# Patient Record
Sex: MALE | Race: WHITE | NOT HISPANIC OR LATINO | Employment: OTHER | URBAN - METROPOLITAN AREA
[De-identification: names, ages, dates, MRNs, and addresses within clinical notes are randomized per-mention and may not be internally consistent; named-entity substitution may affect disease eponyms.]

---

## 2017-08-03 ENCOUNTER — HOSPITAL ENCOUNTER (INPATIENT)
Facility: HOSPITAL | Age: 67
LOS: 1 days | Discharge: HOME | End: 2017-08-05
Attending: FAMILY MEDICINE | Admitting: FAMILY MEDICINE

## 2017-08-03 DIAGNOSIS — Z78.9 IMPAIRED MOBILITY AND ADLS: ICD-10-CM

## 2017-08-03 DIAGNOSIS — Z74.09 IMPAIRED MOBILITY AND ADLS: ICD-10-CM

## 2017-08-03 DIAGNOSIS — Z74.09 IMPAIRED FUNCTIONAL MOBILITY, BALANCE, GAIT, AND ENDURANCE: Primary | ICD-10-CM

## 2017-08-03 LAB — GLUCOSE BLDC GLUCOMTR-MCNC: 295 MG/DL (ref 70–130)

## 2017-08-03 PROCEDURE — 82962 GLUCOSE BLOOD TEST: CPT

## 2017-08-03 RX ORDER — RAMIPRIL 10 MG/1
10 CAPSULE ORAL 2 TIMES DAILY
COMMUNITY

## 2017-08-03 RX ORDER — TAMSULOSIN HYDROCHLORIDE 0.4 MG/1
1 CAPSULE ORAL EVERY MORNING
COMMUNITY

## 2017-08-03 RX ORDER — MECLIZINE HYDROCHLORIDE 25 MG/1
25 TABLET ORAL 3 TIMES DAILY PRN
COMMUNITY
End: 2017-08-05 | Stop reason: HOSPADM

## 2017-08-03 RX ORDER — GLIPIZIDE 10 MG/1
10 TABLET ORAL
COMMUNITY
End: 2017-08-05 | Stop reason: HOSPADM

## 2017-08-03 RX ORDER — TRIAMTERENE AND HYDROCHLOROTHIAZIDE 37.5; 25 MG/1; MG/1
1 CAPSULE ORAL DAILY
COMMUNITY

## 2017-08-03 RX ORDER — HYDROCODONE BITARTRATE AND ACETAMINOPHEN 7.5; 325 MG/1; MG/1
1 TABLET ORAL 3 TIMES DAILY
COMMUNITY

## 2017-08-03 RX ORDER — AMLODIPINE BESYLATE 10 MG/1
10 TABLET ORAL DAILY
COMMUNITY

## 2017-08-03 RX ORDER — ASPIRIN 81 MG/1
81 TABLET, CHEWABLE ORAL DAILY
COMMUNITY

## 2017-08-04 ENCOUNTER — APPOINTMENT (OUTPATIENT)
Dept: CARDIOLOGY | Facility: HOSPITAL | Age: 67
End: 2017-08-04
Attending: FAMILY MEDICINE

## 2017-08-04 ENCOUNTER — APPOINTMENT (OUTPATIENT)
Dept: CT IMAGING | Facility: HOSPITAL | Age: 67
End: 2017-08-04

## 2017-08-04 ENCOUNTER — APPOINTMENT (OUTPATIENT)
Dept: MRI IMAGING | Facility: HOSPITAL | Age: 67
End: 2017-08-04

## 2017-08-04 ENCOUNTER — APPOINTMENT (OUTPATIENT)
Dept: MRI IMAGING | Facility: HOSPITAL | Age: 67
End: 2017-08-04
Attending: PSYCHIATRY & NEUROLOGY

## 2017-08-04 PROBLEM — N40.0 BPH (BENIGN PROSTATIC HYPERPLASIA): Chronic | Status: ACTIVE | Noted: 2017-08-04

## 2017-08-04 PROBLEM — I10 HYPERTENSION: Chronic | Status: ACTIVE | Noted: 2017-08-04

## 2017-08-04 PROBLEM — I63.9 CEREBELLAR STROKE (HCC): Status: ACTIVE | Noted: 2017-08-04

## 2017-08-04 PROBLEM — E11.8 TYPE 2 DIABETES MELLITUS WITH COMPLICATION, WITHOUT LONG-TERM CURRENT USE OF INSULIN (HCC): Chronic | Status: ACTIVE | Noted: 2017-08-04

## 2017-08-04 LAB
ALBUMIN SERPL-MCNC: 4.2 G/DL (ref 3.2–4.8)
ALBUMIN/GLOB SERPL: 1.2 G/DL (ref 1.5–2.5)
ALP SERPL-CCNC: 54 U/L (ref 25–100)
ALT SERPL W P-5'-P-CCNC: 18 U/L (ref 7–40)
ANION GAP SERPL CALCULATED.3IONS-SCNC: 13 MMOL/L (ref 3–11)
ARTICHOKE IGE QN: 108 MG/DL (ref 0–130)
AST SERPL-CCNC: 15 U/L (ref 0–33)
BH CV ECHO MEAS - AO ROOT AREA (BSA CORRECTED): 1.4
BH CV ECHO MEAS - AO ROOT AREA: 9.6 CM^2
BH CV ECHO MEAS - AO ROOT DIAM: 3.5 CM
BH CV ECHO MEAS - BSA(HAYCOCK): 2.6 M^2
BH CV ECHO MEAS - BSA: 2.4 M^2
BH CV ECHO MEAS - BZI_BMI: 42.5 KILOGRAMS/M^2
BH CV ECHO MEAS - BZI_METRIC_HEIGHT: 175.3 CM
BH CV ECHO MEAS - BZI_METRIC_WEIGHT: 130.6 KG
BH CV ECHO MEAS - CONTRAST EF (2CH): 63.5 ML/M^2
BH CV ECHO MEAS - CONTRAST EF 4CH: 67.2 ML/M^2
BH CV ECHO MEAS - EDV(CUBED): 107 ML
BH CV ECHO MEAS - EDV(MOD-SP2): 126 ML
BH CV ECHO MEAS - EDV(MOD-SP4): 137 ML
BH CV ECHO MEAS - EDV(TEICH): 104.8 ML
BH CV ECHO MEAS - EF(CUBED): 63.4 %
BH CV ECHO MEAS - EF(MOD-SP2): 63.5 %
BH CV ECHO MEAS - EF(MOD-SP4): 65 %
BH CV ECHO MEAS - EF(TEICH): 54.8 %
BH CV ECHO MEAS - ESV(CUBED): 39.2 ML
BH CV ECHO MEAS - ESV(MOD-SP2): 46 ML
BH CV ECHO MEAS - ESV(MOD-SP4): 45 ML
BH CV ECHO MEAS - ESV(TEICH): 47.3 ML
BH CV ECHO MEAS - FS: 28.5 %
BH CV ECHO MEAS - IVS/LVPW: 1.1
BH CV ECHO MEAS - IVSD: 1.5 CM
BH CV ECHO MEAS - LA DIMENSION: 3.8 CM
BH CV ECHO MEAS - LA/AO: 1.1
BH CV ECHO MEAS - LAT PEAK E' VEL: 10.9 CM/SEC
BH CV ECHO MEAS - LV DIASTOLIC VOL/BSA (35-75): 56.8 ML/M^2
BH CV ECHO MEAS - LV IVRT: 0.09 SEC
BH CV ECHO MEAS - LV MASS(C)D: 273.5 GRAMS
BH CV ECHO MEAS - LV MASS(C)DI: 113.4 GRAMS/M^2
BH CV ECHO MEAS - LV SYSTOLIC VOL/BSA (12-30): 18.7 ML/M^2
BH CV ECHO MEAS - LVIDD: 4.7 CM
BH CV ECHO MEAS - LVIDS: 3.4 CM
BH CV ECHO MEAS - LVLD AP2: 8.9 CM
BH CV ECHO MEAS - LVLD AP4: 9.1 CM
BH CV ECHO MEAS - LVLS AP2: 7.9 CM
BH CV ECHO MEAS - LVLS AP4: 8.4 CM
BH CV ECHO MEAS - LVOT AREA (M): 3.8 CM^2
BH CV ECHO MEAS - LVOT AREA: 3.9 CM^2
BH CV ECHO MEAS - LVOT DIAM: 2.2 CM
BH CV ECHO MEAS - LVPWD: 1.4 CM
BH CV ECHO MEAS - MED PEAK E' VEL: 6.69 CM/SEC
BH CV ECHO MEAS - MV A MAX VEL: 65.6 CM/SEC
BH CV ECHO MEAS - MV DEC TIME: 0.25 SEC
BH CV ECHO MEAS - MV E MAX VEL: 66.7 CM/SEC
BH CV ECHO MEAS - MV E/A: 1
BH CV ECHO MEAS - PA ACC SLOPE: 786.8 CM/SEC^2
BH CV ECHO MEAS - PA ACC TIME: 0.11 SEC
BH CV ECHO MEAS - PA PR(ACCEL): 29.9 MMHG
BH CV ECHO MEAS - RAP SYSTOLE: 8 MMHG
BH CV ECHO MEAS - RVSP: 25 MMHG
BH CV ECHO MEAS - SI(CUBED): 28.1 ML/M^2
BH CV ECHO MEAS - SI(MOD-SP2): 33.2 ML/M^2
BH CV ECHO MEAS - SI(MOD-SP4): 38.1 ML/M^2
BH CV ECHO MEAS - SI(TEICH): 23.8 ML/M^2
BH CV ECHO MEAS - SV(CUBED): 67.8 ML
BH CV ECHO MEAS - SV(MOD-SP2): 80 ML
BH CV ECHO MEAS - SV(MOD-SP4): 92 ML
BH CV ECHO MEAS - SV(TEICH): 57.5 ML
BH CV ECHO MEAS - TAPSE (>1.6): 2.6 CM2
BH CV ECHO MEAS - TR MAX VEL: 208.7 CM/SEC
BH CV VAS BP LEFT ARM: NORMAL MMHG
BH CV XLRA - RV BASE: 5.4 CM
BH CV XLRA - RV LENGTH: 8.7 CM
BH CV XLRA - RV MID: 4.1 CM
BH CV XLRA - TDI S': 16 CM/SEC
BH CV XLRA MEAS LEFT CCA RATIO VEL: 103 CM/SEC
BH CV XLRA MEAS LEFT DIST CCA EDV: 15.4 CM/SEC
BH CV XLRA MEAS LEFT DIST CCA PSV: 104.1 CM/SEC
BH CV XLRA MEAS LEFT DIST ICA EDV: 22.4 CM/SEC
BH CV XLRA MEAS LEFT DIST ICA PSV: 85.6 CM/SEC
BH CV XLRA MEAS LEFT ICA RATIO VEL: 67.2 CM/SEC
BH CV XLRA MEAS LEFT ICA/CCA RATIO: 0.65
BH CV XLRA MEAS LEFT MID ICA EDV: 19.3 CM/SEC
BH CV XLRA MEAS LEFT MID ICA PSV: 67.6 CM/SEC
BH CV XLRA MEAS LEFT PROX CCA EDV: 16.3 CM/SEC
BH CV XLRA MEAS LEFT PROX CCA PSV: 116.9 CM/SEC
BH CV XLRA MEAS LEFT PROX ECA PSV: 123.6 CM/SEC
BH CV XLRA MEAS LEFT PROX ICA EDV: 14.9 CM/SEC
BH CV XLRA MEAS LEFT PROX ICA PSV: 53.8 CM/SEC
BH CV XLRA MEAS LEFT PROX SCLA PSV: 110.9 CM/SEC
BH CV XLRA MEAS LEFT VERTEBRAL A EDV: 4.3 CM/SEC
BH CV XLRA MEAS LEFT VERTEBRAL A PSV: 28.7 CM/SEC
BH CV XLRA MEAS RIGHT CCA RATIO VEL: 88 CM/SEC
BH CV XLRA MEAS RIGHT DIST CCA EDV: 13.4 CM/SEC
BH CV XLRA MEAS RIGHT DIST CCA PSV: 88.8 CM/SEC
BH CV XLRA MEAS RIGHT DIST ICA EDV: 23.6 CM/SEC
BH CV XLRA MEAS RIGHT DIST ICA PSV: 114.7 CM/SEC
BH CV XLRA MEAS RIGHT ICA RATIO VEL: 126 CM/SEC
BH CV XLRA MEAS RIGHT ICA/CCA RATIO: 1.4
BH CV XLRA MEAS RIGHT MID ICA EDV: 18.9 CM/SEC
BH CV XLRA MEAS RIGHT MID ICA PSV: 126.5 CM/SEC
BH CV XLRA MEAS RIGHT PROX CCA EDV: 7.9 CM/SEC
BH CV XLRA MEAS RIGHT PROX CCA PSV: 116.3 CM/SEC
BH CV XLRA MEAS RIGHT PROX ECA PSV: 131.2 CM/SEC
BH CV XLRA MEAS RIGHT PROX ICA EDV: 22 CM/SEC
BH CV XLRA MEAS RIGHT PROX ICA PSV: 99 CM/SEC
BH CV XLRA MEAS RIGHT PROX SCLA PSV: 103.7 CM/SEC
BH CV XLRA MEAS RIGHT VERTEBRAL A EDV: 12.8 CM/SEC
BH CV XLRA MEAS RIGHT VERTEBRAL A PSV: 44.7 CM/SEC
BILIRUB SERPL-MCNC: 1 MG/DL (ref 0.3–1.2)
BILIRUB UR QL STRIP: NEGATIVE
BUN BLD-MCNC: 23 MG/DL (ref 9–23)
BUN/CREAT SERPL: 25.6 (ref 7–25)
CALCIUM SPEC-SCNC: 10 MG/DL (ref 8.7–10.4)
CHLORIDE SERPL-SCNC: 96 MMOL/L (ref 99–109)
CHOLEST SERPL-MCNC: 170 MG/DL (ref 0–200)
CLARITY UR: CLEAR
CO2 SERPL-SCNC: 21 MMOL/L (ref 20–31)
COLOR UR: YELLOW
CREAT BLD-MCNC: 0.9 MG/DL (ref 0.6–1.3)
DEPRECATED RDW RBC AUTO: 41.6 FL (ref 37–54)
E/E' RATIO: 7.5
ERYTHROCYTE [DISTWIDTH] IN BLOOD BY AUTOMATED COUNT: 12.7 % (ref 11.3–14.5)
GFR SERPL CREATININE-BSD FRML MDRD: 84 ML/MIN/1.73
GLOBULIN UR ELPH-MCNC: 3.4 GM/DL
GLUCOSE BLD-MCNC: 287 MG/DL (ref 70–100)
GLUCOSE BLDC GLUCOMTR-MCNC: 223 MG/DL (ref 70–130)
GLUCOSE BLDC GLUCOMTR-MCNC: 229 MG/DL (ref 70–130)
GLUCOSE BLDC GLUCOMTR-MCNC: 248 MG/DL (ref 70–130)
GLUCOSE BLDC GLUCOMTR-MCNC: 256 MG/DL (ref 70–130)
GLUCOSE BLDC GLUCOMTR-MCNC: 287 MG/DL (ref 70–130)
GLUCOSE UR STRIP-MCNC: ABNORMAL MG/DL
HBA1C MFR BLD: 12.5 % (ref 4.8–5.6)
HCT VFR BLD AUTO: 43.5 % (ref 38.9–50.9)
HDLC SERPL-MCNC: 33 MG/DL (ref 40–60)
HGB BLD-MCNC: 14.6 G/DL (ref 13.1–17.5)
HGB UR QL STRIP.AUTO: NEGATIVE
KETONES UR QL STRIP: NEGATIVE
LEFT ATRIUM VOLUME INDEX: 31.5 ML/M2
LEUKOCYTE ESTERASE UR QL STRIP.AUTO: NEGATIVE
LV EF 2D ECHO EST: 65 %
MCH RBC QN AUTO: 30.1 PG (ref 27–31)
MCHC RBC AUTO-ENTMCNC: 33.6 G/DL (ref 32–36)
MCV RBC AUTO: 89.7 FL (ref 80–99)
NITRITE UR QL STRIP: NEGATIVE
PH UR STRIP.AUTO: <=5 [PH] (ref 5–8)
PLATELET # BLD AUTO: 239 10*3/MM3 (ref 150–450)
PMV BLD AUTO: 9.1 FL (ref 6–12)
POTASSIUM BLD-SCNC: 3.9 MMOL/L (ref 3.5–5.5)
PROT SERPL-MCNC: 7.6 G/DL (ref 5.7–8.2)
PROT UR QL STRIP: NEGATIVE
RBC # BLD AUTO: 4.85 10*6/MM3 (ref 4.2–5.76)
SODIUM BLD-SCNC: 130 MMOL/L (ref 132–146)
SP GR UR STRIP: 1.03 (ref 1–1.03)
TRIGL SERPL-MCNC: 258 MG/DL (ref 0–150)
UROBILINOGEN UR QL STRIP: ABNORMAL
WBC NRBC COR # BLD: 7.23 10*3/MM3 (ref 3.5–10.8)

## 2017-08-04 PROCEDURE — 25010000002 SULFUR HEXAFLUORIDE MICROSPH 60.7-25 MG RECONSTITUTED SUSPENSION: Performed by: HOSPITALIST

## 2017-08-04 PROCEDURE — 82962 GLUCOSE BLOOD TEST: CPT

## 2017-08-04 PROCEDURE — 99223 1ST HOSP IP/OBS HIGH 75: CPT | Performed by: FAMILY MEDICINE

## 2017-08-04 PROCEDURE — 97165 OT EVAL LOW COMPLEX 30 MIN: CPT

## 2017-08-04 PROCEDURE — 93005 ELECTROCARDIOGRAM TRACING: CPT | Performed by: FAMILY MEDICINE

## 2017-08-04 PROCEDURE — C8929 TTE W OR WO FOL WCON,DOPPLER: HCPCS

## 2017-08-04 PROCEDURE — 70450 CT HEAD/BRAIN W/O DYE: CPT

## 2017-08-04 PROCEDURE — 85027 COMPLETE CBC AUTOMATED: CPT | Performed by: FAMILY MEDICINE

## 2017-08-04 PROCEDURE — 80053 COMPREHEN METABOLIC PANEL: CPT | Performed by: FAMILY MEDICINE

## 2017-08-04 PROCEDURE — 83036 HEMOGLOBIN GLYCOSYLATED A1C: CPT | Performed by: FAMILY MEDICINE

## 2017-08-04 PROCEDURE — 93010 ELECTROCARDIOGRAM REPORT: CPT | Performed by: INTERNAL MEDICINE

## 2017-08-04 PROCEDURE — 80061 LIPID PANEL: CPT | Performed by: FAMILY MEDICINE

## 2017-08-04 PROCEDURE — G0108 DIAB MANAGE TRN  PER INDIV: HCPCS | Performed by: REGISTERED NURSE

## 2017-08-04 PROCEDURE — 97162 PT EVAL MOD COMPLEX 30 MIN: CPT

## 2017-08-04 PROCEDURE — 93880 EXTRACRANIAL BILAT STUDY: CPT | Performed by: INTERNAL MEDICINE

## 2017-08-04 PROCEDURE — 81003 URINALYSIS AUTO W/O SCOPE: CPT | Performed by: FAMILY MEDICINE

## 2017-08-04 PROCEDURE — 99223 1ST HOSP IP/OBS HIGH 75: CPT | Performed by: PSYCHIATRY & NEUROLOGY

## 2017-08-04 PROCEDURE — 63710000001 INSULIN LISPRO (HUMAN) PER 5 UNITS: Performed by: FAMILY MEDICINE

## 2017-08-04 PROCEDURE — 93306 TTE W/DOPPLER COMPLETE: CPT | Performed by: INTERNAL MEDICINE

## 2017-08-04 PROCEDURE — 93880 EXTRACRANIAL BILAT STUDY: CPT

## 2017-08-04 PROCEDURE — 70544 MR ANGIOGRAPHY HEAD W/O DYE: CPT

## 2017-08-04 RX ORDER — ATORVASTATIN CALCIUM 40 MG/1
80 TABLET, FILM COATED ORAL NIGHTLY
Status: DISCONTINUED | OUTPATIENT
Start: 2017-08-04 | End: 2017-08-05 | Stop reason: HOSPADM

## 2017-08-04 RX ORDER — ASPIRIN 300 MG/1
300 SUPPOSITORY RECTAL DAILY
Status: DISCONTINUED | OUTPATIENT
Start: 2017-08-04 | End: 2017-08-05 | Stop reason: HOSPADM

## 2017-08-04 RX ORDER — BISACODYL 10 MG
10 SUPPOSITORY, RECTAL RECTAL DAILY PRN
Status: DISCONTINUED | OUTPATIENT
Start: 2017-08-03 | End: 2017-08-05 | Stop reason: HOSPADM

## 2017-08-04 RX ORDER — ACETAMINOPHEN 325 MG/1
650 TABLET ORAL EVERY 4 HOURS PRN
Status: DISCONTINUED | OUTPATIENT
Start: 2017-08-03 | End: 2017-08-05 | Stop reason: HOSPADM

## 2017-08-04 RX ORDER — ACETAMINOPHEN 650 MG/1
650 SUPPOSITORY RECTAL EVERY 4 HOURS PRN
Status: DISCONTINUED | OUTPATIENT
Start: 2017-08-03 | End: 2017-08-05 | Stop reason: HOSPADM

## 2017-08-04 RX ORDER — TRIAMTERENE AND HYDROCHLOROTHIAZIDE 37.5; 25 MG/1; MG/1
1 TABLET ORAL DAILY
Status: DISCONTINUED | OUTPATIENT
Start: 2017-08-04 | End: 2017-08-05 | Stop reason: HOSPADM

## 2017-08-04 RX ORDER — NICOTINE POLACRILEX 4 MG
15 LOZENGE BUCCAL
Status: DISCONTINUED | OUTPATIENT
Start: 2017-08-04 | End: 2017-08-05 | Stop reason: HOSPADM

## 2017-08-04 RX ORDER — RAMIPRIL 5 MG/1
10 CAPSULE ORAL EVERY 12 HOURS SCHEDULED
Status: DISCONTINUED | OUTPATIENT
Start: 2017-08-04 | End: 2017-08-05 | Stop reason: HOSPADM

## 2017-08-04 RX ORDER — HYDROCODONE BITARTRATE AND ACETAMINOPHEN 7.5; 325 MG/1; MG/1
1 TABLET ORAL 3 TIMES DAILY
Status: DISCONTINUED | OUTPATIENT
Start: 2017-08-04 | End: 2017-08-05 | Stop reason: HOSPADM

## 2017-08-04 RX ORDER — ASPIRIN 325 MG
325 TABLET ORAL DAILY
Status: DISCONTINUED | OUTPATIENT
Start: 2017-08-04 | End: 2017-08-05 | Stop reason: HOSPADM

## 2017-08-04 RX ORDER — AMLODIPINE BESYLATE 10 MG/1
10 TABLET ORAL DAILY
Status: DISCONTINUED | OUTPATIENT
Start: 2017-08-04 | End: 2017-08-05 | Stop reason: HOSPADM

## 2017-08-04 RX ORDER — ONDANSETRON 2 MG/ML
4 INJECTION INTRAMUSCULAR; INTRAVENOUS EVERY 6 HOURS PRN
Status: DISCONTINUED | OUTPATIENT
Start: 2017-08-03 | End: 2017-08-05 | Stop reason: HOSPADM

## 2017-08-04 RX ORDER — TAMSULOSIN HYDROCHLORIDE 0.4 MG/1
0.4 CAPSULE ORAL EVERY MORNING
Status: DISCONTINUED | OUTPATIENT
Start: 2017-08-04 | End: 2017-08-05 | Stop reason: HOSPADM

## 2017-08-04 RX ORDER — SODIUM CHLORIDE 0.9 % (FLUSH) 0.9 %
1-10 SYRINGE (ML) INJECTION AS NEEDED
Status: DISCONTINUED | OUTPATIENT
Start: 2017-08-03 | End: 2017-08-05 | Stop reason: HOSPADM

## 2017-08-04 RX ORDER — DEXTROSE MONOHYDRATE 25 G/50ML
25 INJECTION, SOLUTION INTRAVENOUS
Status: DISCONTINUED | OUTPATIENT
Start: 2017-08-04 | End: 2017-08-05 | Stop reason: HOSPADM

## 2017-08-04 RX ADMIN — TAMSULOSIN HYDROCHLORIDE 0.4 MG: 0.4 CAPSULE ORAL at 08:31

## 2017-08-04 RX ADMIN — INSULIN LISPRO 3 UNITS: 100 INJECTION, SOLUTION INTRAVENOUS; SUBCUTANEOUS at 16:54

## 2017-08-04 RX ADMIN — TRIAMTERENE AND HYDROCHLOROTHIAZIDE 1 TABLET: 37.5; 25 TABLET ORAL at 08:31

## 2017-08-04 RX ADMIN — SULFUR HEXAFLUORIDE 2 ML: KIT at 03:15

## 2017-08-04 RX ADMIN — INSULIN LISPRO 4 UNITS: 100 INJECTION, SOLUTION INTRAVENOUS; SUBCUTANEOUS at 02:33

## 2017-08-04 RX ADMIN — HYDROCODONE BITARTRATE AND ACETAMINOPHEN 1 TABLET: 7.5; 325 TABLET ORAL at 21:35

## 2017-08-04 RX ADMIN — AMLODIPINE BESYLATE 10 MG: 10 TABLET ORAL at 08:31

## 2017-08-04 RX ADMIN — HYDROCODONE BITARTRATE AND ACETAMINOPHEN 1 TABLET: 7.5; 325 TABLET ORAL at 15:31

## 2017-08-04 RX ADMIN — HYDROCODONE BITARTRATE AND ACETAMINOPHEN 1 TABLET: 7.5; 325 TABLET ORAL at 05:28

## 2017-08-04 RX ADMIN — ATORVASTATIN CALCIUM 80 MG: 40 TABLET, FILM COATED ORAL at 21:35

## 2017-08-04 RX ADMIN — ASPIRIN 325 MG ORAL TABLET 325 MG: 325 PILL ORAL at 08:31

## 2017-08-04 RX ADMIN — RAMIPRIL 10 MG: 5 CAPSULE ORAL at 08:31

## 2017-08-04 RX ADMIN — Medication 10 ML: at 21:37

## 2017-08-04 RX ADMIN — INSULIN LISPRO 3 UNITS: 100 INJECTION, SOLUTION INTRAVENOUS; SUBCUTANEOUS at 21:38

## 2017-08-04 RX ADMIN — INSULIN LISPRO 3 UNITS: 100 INJECTION, SOLUTION INTRAVENOUS; SUBCUTANEOUS at 12:33

## 2017-08-04 RX ADMIN — ATORVASTATIN CALCIUM 80 MG: 40 TABLET, FILM COATED ORAL at 02:33

## 2017-08-04 RX ADMIN — INSULIN LISPRO 4 UNITS: 100 INJECTION, SOLUTION INTRAVENOUS; SUBCUTANEOUS at 08:31

## 2017-08-04 RX ADMIN — RAMIPRIL 10 MG: 5 CAPSULE ORAL at 21:35

## 2017-08-04 NOTE — PROGRESS NOTES
Discharge Planning Assessment  [facility]     Patient Name: Rafy Pack  MRN: [id number]  Today's Date: 8/4/2017    Admit Date: 8/3/2017          Discharge Needs Assessment       08/04/17 1501    Living Environment    Lives With spouse    Living Arrangements house    Home Accessibility no concerns    Stair Railings at Home none    Type of Financial/Environmental Concern none    Transportation Available car    Living Environment    Provides Primary Care For no one    Quality Of Family Relationships helpful;involved;supportive    Able to Return to Prior Living Arrangements yes    Discharge Needs Assessment    Concerns To Be Addressed discharge planning concerns    Readmission Within The Last 30 Days no previous admission in last 30 days    Anticipated Changes Related to Illness none    Equipment Currently Used at Home walker, rolling    Equipment Needed After Discharge none    Discharge Contact Information if Applicable Risa Pack, [phone]            Discharge Plan       08/04/17 1502    Case Management/Social Work Plan    Plan Home    Patient/Family In Agreement With Plan yes    Additional Comments Pt lives in [facility] with his wife. His adult children and sister live near him. He has a walker but reports he does not use it often. He was independent with all ADLs prior to admit. He has medicare part D and reports he is followed by his PCP. His goal for discharge is to return home. CM will continue to follow.        Discharge Placement     No information found                Demographic Summary       08/04/17 1459    Referral Information    Admission Type inpatient    Referral Source physician    Reason For Consult discharge planning    Contact Information    Permission Granted to Share Information With ;family/designee;facility     Primary Care Physician Information    Name Wes Segura            Functional Status       08/04/17 1455    Functional Status Current    Current  Functional Level Comment See PT eval    Functional Status Prior    Ambulation 1-->assistive equipment    Transferring 0-->independent    Toileting 0-->independent    Bathing 0-->independent    Dressing 0-->independent    Eating 0-->independent    Communication 0-->understands/communicates without difficulty    Swallowing 0-->swallows foods/liquids without difficulty    IADL    Medications independent    Meal Preparation independent    Housekeeping independent    Laundry independent    Shopping independent    Oral Care independent            Psychosocial     None            Abuse/Neglect     None            Legal     None            Substance Abuse     None            Patient Forms     None          Alexus Jorge RN

## 2017-08-04 NOTE — THERAPY EVALUATION
Acute Care - Occupational Therapy Initial Evaluation  [facility]     Patient Name: Rafy Pack  : 1950  MRN: [id number]  Today's Date: 2017  Onset of Illness/Injury or Date of Surgery Date: 17  Date of Referral to OT: 17  Referring Physician: MD ANNE    Admit Date: 8/3/2017       ICD-10-CM ICD-9-CM   1. Impaired functional mobility, balance, gait, and endurance Z74.09 V49.89   2. Impaired mobility and ADLs Z74.09 799.89     Patient Active Problem List   Diagnosis   • Cerebellar stroke   • BPH (benign prostatic hyperplasia)   • Type 2 diabetes mellitus with complication, without long-term current use of insulin   • Hypertension     Past Medical History:   Diagnosis Date   • BPH (benign prostatic hyperplasia) 6 years   • Chronic back pain    • Diabetes mellitus    • Discitis    • Hypertension    • Stroke      History reviewed. No pertinent surgical history.       OT ASSESSMENT FLOWSHEET (last 72 hours)      OT Evaluation       17 0830 17 0825 17 2349 17 2343       Rehab Evaluation    Document Type evaluation   COMPLETED CHART REVIEW [id number]. CO-RX WITH O.T.   -CD evaluation  -AN       Subjective Information agree to therapy;complains of;dizziness;pain  -CD no complaints;agree to therapy  -AN       Patient Effort, Rehab Treatment  good  -AN       Symptoms Noted During/After Treatment  fatigue  -AN       General Information    Patient Profile Review yes  -CD yes  -AN       Onset of Illness/Injury or Date of Surgery Date 17  -CD 17  -AN       Referring Physician MD ANNE  -CD MD Anne  -AN       General Observations PT UP IN ROOM WITH NSG AND O.T. UPON ARRIVAL. PT ON RA   -CD Pt sitting EOB with nursing, wanting to go to bathroom,pt with facial droop; son present  -AN       Pertinent History Of Current Problem PT TO OSH  WITH 2 DAY H/O SEVERE VERTIGO AND VIOLENT N&V, WAS DISCHARGED HOME WITH DIAGNOSIS OF GASTROENTERITIS AND CONITINUED TO HAVE  SYMPTOMS. PT WENT TO PCP AND MRI REVEALED L CEREBELLAR INFARCT. PT RETURNED TO OSH AND WAS TRANSFERRED TO [facility] FOR HIGHER LOC.   -CD Pt. went to OSH 1 wk ago with dizzines, n/v, severe HA and dx with gastroentrinits; symptoms persisted along with fall and vision changes and PCP ordered MRI, found to have cerebellar CVA  -AN       Precautions/Limitations fall precautions   ATAXIA.   -CD fall precautions  -AN       Prior Level of Function independent:;community mobility;all household mobility;ADL's;driving  -CD independent:;gait;transfer;ADL's;bed mobility;driving   likes to fish  -AN       Equipment Currently Used at Home shower chair;grab bar   RECENTLY USING R WALKER WITH ONSET OF STROKE SYMPTOMS.   -CD grab bar;shower chair   RW at home used this week  -AN  none  -EN     Plans/Goals Discussed With patient and family;agreed upon  -CD patient and family;agreed upon  -AN       Risks Reviewed patient and family:;nausea/vomiting;dizziness;change in vital signs;increased discomfort;LOB  -CD patient and family:;LOB;nausea/vomiting;dizziness;increased discomfort;change in vital signs  -AN       Benefits Reviewed patient and family:;improve function;increase independence;increase strength;increase balance;increase knowledge  -CD patient and family:;improve function;increase independence;increase strength;increase balance  -AN       Barriers to Rehab none identified  -CD none identified  -AN       Living Environment    Lives With spouse  -CD spouse;other (see comments)   Granddaughter  -AN significant other;child(melissa), dependent  -EN      Living Arrangements house  -CD house  -AN house  -EN      Home Accessibility ramps present at home;tub/shower is not walk in  -CD ramps present at home  -AN no concerns  -EN      Stair Railings at Home   none  -EN      Type of Financial/Environmental Concern   none  -EN      Transportation Available   car  -EN      Living Environment Comment PT IS RETIRED AND LIKES TO FISH.  -CD         Clinical Impression    Date of Referral to OT  08/03/17  -AN       OT Diagnosis  Decreased ADL I  -AN       Impairments Found (describe specific impairments)  muscle performance;gait, locomotion, and balance  -AN       Patient/Family Goals Statement  Agreeable to OT eval and agreeable to outpt therapy  -AN       Criteria for Skilled Therapeutic Interventions Met  yes;treatment indicated  -AN       Rehab Potential  good, to achieve stated therapy goals  -AN       Therapy Frequency  daily  -AN       Anticipated Equipment Needs At Discharge  reacher  -AN       Anticipated Discharge Disposition  home with assist;home with outpatient services  -AN       Functional Level Prior    Ambulation    0-->independent  -EN     Transferring    0-->independent  -EN     Toileting    0-->independent  -EN     Bathing    0-->independent  -EN     Dressing    0-->independent  -EN     Eating    0-->independent  -EN     Communication    0-->understands/communicates without difficulty  -EN     Swallowing    0-->swallows foods/liquids without difficulty  -EN     Vital Signs    Pre Systolic BP Rehab 163  -  -AN       Pre Treatment Diastolic BP 81  -CD 91  -AN       Intra Systolic BP Rehab  163  -AN       Intra Treatment Diastolic BP  81  -AN       Post Systolic BP Rehab 166  -  -AN       Post Treatment Diastolic BP 87  -CD 87  -AN       Pretreatment Heart Rate (beats/min) 109  -  -AN       Posttreatment Heart Rate (beats/min) 101  -CD 95  -AN       Pre SpO2 (%) 96  -CD        O2 Delivery Pre Treatment room air  -CD        Post SpO2 (%) 93  -CD 93  -AN       O2 Delivery Post Treatment room air  -CD room air  -AN       Pre Patient Position  Supine  -AN       Intra Patient Position  Standing  -AN       Post Patient Position  Sitting  -AN       Pain Assessment    Pain Assessment Bonilla-Hauser FACES  -CD Bonilla-Baker FACES  -AN       Bonilla-Hauser FACES Pain Rating 2  -CD        Post Pain Score  2  -AN       Pain Type Chronic pain   0/10  POST.   -CD Acute pain  -AN       Pain Location Back  -CD Neck  -AN       Pain Intervention(s) Repositioned;Ambulation/increased activity  -CD        Response to Interventions TOLERATED  -CD        Vision Assessment/Intervention    Visual Impairment  visual impairment, left;nystagmus  -AN       Vision Comment --   SEE O.T. NOTE.   -CD Decreased gaze and accurate tracking on L; nystagmus brief at L  -AN       Cognitive Assessment/Intervention    Current Cognitive/Communication Assessment functional  -CD functional  -AN       Orientation Status oriented x 4  -CD oriented x 4  -AN       Follows Commands/Answers Questions 100% of the time  -% of the time  -AN       Personal Safety impulsive;mild impairment;decreased awareness, need for assist;decreased awareness, need for safety;decreased insight to deficits  -CD impulsive;mild impairment;decreased awareness, need for safety;decreased awareness, need for assist  -AN       Personal Safety Interventions fall prevention program maintained;gait belt;muscle strengthening facilitated;nonskid shoes/slippers when out of bed;supervised activity  -CD fall prevention program maintained  -AN       ROM (Range of Motion)    General ROM lower extremity range of motion deficits identified   LIMITED HIP FLEXION IN SITTING DUE TO OBESITY/BACK PAIN.   -CD no range of motion deficits identified  -AN       MMT (Manual Muscle Testing)    General MMT Assessment lower extremity strength deficits identified   R LE 5/5 DF, KNEE FLEX/EXT, 4/5 HIP, L LE 4/5 HIP/KNEE, DF 5  -CD upper extremity strength deficits identified  -AN       General MMT Assessment Detail  L 4/5, R 4+/5  -AN       Bed Mobility, Assessment/Treatment    Bed Mobility, Comment PT SITTING EOB UPON ARRIVAL.   -CD pt sitting EOB with nursing upon  arrival  -AN       Transfer Assessment/Treatment    Transfers, Sit-Stand [place] contact guard assist  -CD contact guard assist;2 person assist required  -AN       Transfers,  Stand-Sit [place] contact guard assist  -CD verbal cues required;contact guard assist;2 person assist required  -AN       Transfers, Sit-Stand-Sit, Assist Device --   R UE SUPPORT, GAIT BELT.   -CD        Toilet Transfer, [place]  contact guard assist;verbal cues required  -AN       Transfer, Safety Issues  step length decreased  -AN       Transfer, Impairments  impaired balance  -AN       Transfer, Comment PT IMPULSIVE WITH STANDING FROM EOB AND COMMODE.   -CD pt. with wide LUPE, needs assist to steady  -AN       Functional Mobility    Functional Mobility- Ind. Level  contact guard assist;2 person assist required  -AN       Functional Mobility-Distance (Feet)  --   in hallway, see PT note  -AN       Functional Mobility- Safety Issues  step length decreased;weight-shifting ability decreased  -AN       Functional Mobility- Comment  use of RW end of mobility, supvervision  -AN       Lower Body Dressing Assessment/Training    LB Dressing Assess/Train, Comment  simulated CGA  -AN       Toileting Assessment/Training    Toileting Assess/Train, Position  sitting  -AN       Toileting Assess/Train, Indepen Level  supervision required  -AN       Toileting Assess/Train, Impairments  impaired balance  -AN       Motor Skills/Interventions    Additional Documentation  Balance Skills Training (Group);Fine Motor Coordination Training (Group);Gross Motor Coordination Training (Group)  -AN       Balance Skills Training    Sitting-Level of Assistance  Close supervision  -AN       Sitting-Balance Support  Feet supported  -AN       Sitting-Balance Activities  Lateral lean  -AN       Sitting # of Minutes  6  -AN       Standing-Level of Assistance  Contact guard;x2  -AN       Static Standing Balance Support  assistive device  -AN       Standing-Balance Activities  Reaching for objects  -AN       Standing Balance # of Minutes  2  -AN       Gross Motor Coordination Training    Gross Motor Skill, Impairments Detail  impaired L  finger to nose  -AN       Fine Motor Coordination Training    Detail (Fine Motor Coordination Training)  mild impairment L UE  -AN       Sensory Assessment/Intervention    Light Touch LLE;RLE   INTACT.   -CD        General Therapy Interventions    Planned Therapy Interventions  ADL retraining;balance training;transfer training;strengthening;visual retraining;motor coordination training  -AN       Positioning and Restraints    Pre-Treatment Position standing in room  -CD in bed  -AN       Post Treatment Position chair  -CD chair  -AN       In Chair sitting;call light within reach;exit alarm on;encouraged to call for assist;with family/caregiver;notified nsg  -CD notified nsg;reclined;call light within reach;encouraged to call for assist;with family/caregiver;exit alarm on  -AN         User Key  (r) = Recorded By, (t) = Taken By, (c) = Cosigned By    Initials Name Effective Dates    CD Shanna Eli, PT 06/19/15 -     NICK Shahid OT 06/22/15 -     TRENT Barahona RN 06/16/16 -            Occupational Therapy Education     Title: PT OT SLP Therapies (Active)     Topic: Occupational Therapy (Active)     Point: ADL training (Done)    Description: Instruct learner(s) on proper safety adaptation and remediation techniques during self care or transfers.   Instruct in proper use of assistive devices.    Learning Progress Summary    Learner Readiness Method Response Comment Documented by Status   Patient Acceptance E,D NR,VU Educated pt on current deficits and need for assist with all transfers and ADL's. AN 08/04/17 0939 Done   Family Acceptance E,D NR,VU Educated pt on current deficits and need for assist with all transfers and ADL's. AN 08/04/17 0939 Done                      User Key     Initials Effective Dates Name Provider Type Discipline    AN 06/22/15 -  Olga Shahid, OT Occupational Therapist OT                  OT Recommendation and Plan  Anticipated Equipment Needs At Discharge: reacher  Anticipated  Discharge Disposition: home with assist, home with outpatient services  Planned Therapy Interventions: ADL retraining, balance training, transfer training, strengthening, visual retraining, motor coordination training  Therapy Frequency: daily  Plan of Care Review  Plan Of Care Reviewed With: patient, son  Outcome Summary/Follow up Plan: Pt presents with balance and L coordiantion/strength deficits leading to CG assist needed with ADL's and mobility. Pt with minimal PMH which should not hinder oupt tx of deficits. Recommned home with outpt.           OT Goals       08/04/17 0945          Transfer Training OT LTG    Transfer Training OT LTG, Date Established 08/04/17  -AN      Transfer Training OT LTG, Time to Achieve 1 wk  -AN      Transfer Training OT LTG, Activity Type all transfers  -AN      Transfer Training OT LTG, [place] Level set up required  -AN      Bathing OT LTG    Bathing Goal OT LTG, Date Established 08/04/17  -AN      Bathing Goal OT LTG, Time to Achieve 1 wk  -AN      Bathing Goal OT LTG, Activity Type simulates;upper body bathing;lower body bathing  -AN      Bathing Goal OT LTG, [place] Level conditional independence  -AN      Bathing Goal OT LTG, Assist Device shower chair  -AN      Safety Awareness OT LTG    Safety Awareness OT LTG, Date Established 08/04/17  -AN      Safety Awareness OT LTG, Time to Achieve 1 wk  -AN      Safety Awareness OT LTG, Activity Type good safety awareness;with ADL's;with home mgmt task  -AN      Coordination OT LTG    Coordination OT LTG, Date Established 08/04/17  -AN      Coordination OT LTG, Time to Achieve 1 wk  -AN      Coordination OT LTG, Activity Type GM task;FM task  -AN      Coordination OT LTG, [place] Level standby assist  -AN        User Key  (r) = Recorded By, (t) = Taken By, (c) = Cosigned By    Initials Name Provider Type    AN Olga Shahid OT Occupational Therapist                Outcome Measures       08/04/17 0835 08/04/17 0830        How much help from another person do you currently need...    Turning from your back to your side while in flat bed without using bedrails?  4  -CD     Moving from lying on back to sitting on the side of a flat bed without bedrails?  3  -CD     Moving to and from a bed to a chair (including a wheelchair)?  3  -CD     Standing up from a chair using your arms (e.g., wheelchair, bedside chair)?  3  -CD     Climbing 3-5 steps with a railing?  2  -CD     To walk in hospital room?  3  -CD     AM-PAC 6 Clicks Score  18  -CD     How much help from another is currently needed...    Putting on and taking off regular lower body clothing? 3  -AN      Bathing (including washing, rinsing, and drying) 3  -AN      Toileting (which includes using toilet bed pan or urinal) 4  -AN      Putting on and taking off regular upper body clothing 4  -AN      Taking care of personal grooming (such as brushing teeth) 4  -AN      Eating meals 4  -AN      Score 22  -AN      Modified [place] Scale    Modified [place] Scale 3 - Moderate disability.  Requiring some help, but able to walk without assistance.  -AN 3 - Moderate disability.  Requiring some help, but able to walk without assistance.  -CD     Functional Assessment    Outcome Measure Options AM-PAC 6 Clicks Daily Activity (OT);Modified [place]  -AN AM-PAC 6 Clicks Basic Mobility (PT);Modified [place]  -CD       User Key  (r) = Recorded By, (t) = Taken By, (c) = Cosigned By    Initials Name Provider Type    CD Shanna Eli, PT Physical Therapist    NICK Shahid OT Occupational Therapist          Time Calculation:   OT Start Time: 0825    Therapy Charges for Today     Code Description Service Date Service Provider Modifiers Qty    [id number]  OT EVAL LOW COMPLEXITY 4 8/4/2017 Olga Shahid OT GO 1               Olga Shahid OT  8/4/2017

## 2017-08-04 NOTE — PLAN OF CARE
Problem: Stroke (Ischemic) (Adult)  Goal: Signs and Symptoms of Listed Potential Problems Will be Absent or Manageable (Stroke)  Outcome: Ongoing (interventions implemented as appropriate)    08/04/17 1815   Stroke (Ischemic)   Problems Assessed (Stroke (Ischemic)/TIA) all   Problems Present (Stroke (Ischemic)/TIA) motor/sensory impairment         Problem: Fall Risk (Adult)  Goal: Identify Related Risk Factors and Signs and Symptoms  Outcome: Ongoing (interventions implemented as appropriate)    08/04/17 1815   Fall Risk   Fall Risk: Related Risk Factors neuro disease/injury;environment unfamiliar;gait/mobility problems;history of falls   Fall Risk: Signs and Symptoms presence of risk factors

## 2017-08-04 NOTE — NURSING NOTE
ACC REVIEW REPORT: [facility]        PATIENT NAME: Rafy Pack    PATIENT ID: [id number]    BED: [id number]    BED TYPE: TELE    BED GIVEN TO: ESTHER VALENZUELA RN     TIME BED GIVEN: 2140    YOB: 1950    AGE: 67 YRS    GENDER: MALE    PREVIOUS ADMIT TO [facility]:     PREVIOUS ADMISSION DATE:     PATIENT CLASS:     TODAY'S DATE: 8/3/2017    TRANSFER DATE: 08/03/17    ETA:     TRANSFERRING FACILITY: [facility]    TRANSFERRING FACILITY PHONE # : [phone]    TRANSFERRING MD:     DATE/TIME REQUEST RECEIVED: 08/03/17 @ 2103    [facility] RN: CYNTHIA ROWAN    REPORT FROM: ESTHER VALENZUELA RN     TIME REPORT TAKEN: 2115    DIAGNOSIS: CVA    REASON FOR TRANSFER TO [facility]: HIGHER LEVEL CARE    TRANSPORTATION: GROUND    CLINICAL REASON FOR TRANSFER TO [facility]: SENT FROM RADIOLOGY AFTER HAVING AN MRI THAT REVEALED A LEFT CEREBRAL INFARCTION WITH ASSOCIATED MILD EDEMA.  APPARENTLY, LAST Wednesday, 1 WEEK AGO, PATIENT WAS SEEN AT [facility] FOR A SEVERE HEADACHE OVER HIS TEMPLE AREA WITH COMPLAINTS OF NAUSEA, VOMITING AND DIZZINESS.  HE WAS TOLD THAT HE HAD GASTROENTERITIS.  Saturday, HE WENT TO SEE HIS PCP AND WAS SENT FOR AN HALI.  HE HAS A HISTORY OF HAVING A PREVIOUS STROKE, HTN & DIABETES AND CURRENTLY THE ONLY DEFICIT IS HIS COMPLAINT OF FEELING DIZZY.  HIS GLUCOSE  AND USUALLY RUNS AROUND 300.  HIS NA , POTASSIUM-5.4.  THE REMAINING LAB RESULTS ARE PENDING.        CLINICAL INFORMATION    HEIGHT:     WEIGHT:     ALLERGIES: NKDA    DIEGO:     INFECTIOUS DISEASE:     ISOLATION:     LAST VITAL SIGNS:  TIME: 2045  TEMP: 98.8  PULSE: 94  B/P: 112/83  RESP: 20  97% ON RMA  SR ON THE MONITOR    LAB INFORMATION:  POTASSIUM-5.4, GLUCOSE-278, NA-130    CULTURE INFORMATION:     MEDS/IV FLUIDS: # 20 G RIGHT HAND/ SL      CARDIAC SYSTEM:    CHEST PAIN: NO    RATE:     SCALE:     RHYTHM: SR    Is patient taking or has patient been given any drugs that could increase bleeding? NO PER REPORT  (Plavix, Brilinta, Effient,  "Eliquis, Xarelto, Warfarin, Integrilin, Angiomax)      CARDIAC NOTES:  NSR      RESPIRATORY SYSTEM:    LUNG SOUNDS:    CLEAR: YES  CRACKLES:   WHEEZES:   RHONCHI:   DIMINISHED:       CNS/MUSCULOSKELETAL    ALERT AND ORIENTED:    PERSON: YES  PLACE: YES  TIME: YES    INJURY:  NO  WHERE:     STROKE SCALE:  0        ASPHASIA: NO  ATAXIA: NO  DYSARTHRIA: NO  DYSPHASIA: NO  HEADACHE: NO  PARALYSIS: NO  SEIZURE: NO  SYNCOPE: NO  VERTIGO: YES  VISION CHANGE:  NO      EXTREMITY WEAKNESS:    LEFT ARM:   RIGHT ARM:   LEFT LEG:   RIGHT LEG:     CAT SCAN RESULTS:     MRI RESULTS: LEFT CEREBRAL INFARCTION WITH ASSOCIATED MILD EDEMA.    CNS/MUSCULOSKELETAL NOTES:  NO DEFICITS NOTED.  HIS ONLY COMPLAINT IS \"DIZZINESS.\"      GI//GY  WNL      ABDOMINAL PAIN: NO    VOMITING: NO    DIARRHEA: NO    NAUSEA: NO        PAST MEDICAL HISTORY: PREVIOUS STROKE, HTN AND DIABETES    OTHER SYMPTOM NOTES: VERTIGO    ADDITIONAL NOTES:           July Haji RN  8/3/2017  9:23 PM        "

## 2017-08-04 NOTE — THERAPY EVALUATION
Acute Care - Physical Therapy Initial Evaluation  [facility]     Patient Name: Rafy Pack  : 1950  MRN: [id number]  Today's Date: 2017   Onset of Illness/Injury or Date of Surgery Date: 17  Date of Referral to PT: 17  Referring Physician: MD ANNE      Admit Date: 8/3/2017     Visit Dx:    ICD-10-CM ICD-9-CM   1. Impaired functional mobility, balance, gait, and endurance Z74.09 V49.89   2. Impaired mobility and ADLs Z74.09 799.89     Patient Active Problem List   Diagnosis   • Cerebellar stroke   • BPH (benign prostatic hyperplasia)   • Type 2 diabetes mellitus with complication, without long-term current use of insulin   • Hypertension     Past Medical History:   Diagnosis Date   • BPH (benign prostatic hyperplasia) 6 years   • Chronic back pain    • Diabetes mellitus    • Discitis    • Hypertension    • Stroke      History reviewed. No pertinent surgical history.       PT ASSESSMENT (last 72 hours)      PT Evaluation       17 0830 17 0825    Rehab Evaluation    Document Type evaluation   COMPLETED CHART REVIEW [id number]. CO-RX WITH O.T.   -CD evaluation  -AN    Subjective Information agree to therapy;complains of;dizziness;pain  -CD no complaints;agree to therapy  -AN    Patient Effort, Rehab Treatment  good  -AN    Symptoms Noted During/After Treatment  fatigue  -AN    General Information    Patient Profile Review yes  -CD yes  -AN    Onset of Illness/Injury or Date of Surgery Date 17  -CD 17  -AN    Referring Physician MD ANNE  -CD MD Anne  -AN    General Observations PT UP IN ROOM WITH NSG AND O.T. UPON ARRIVAL. PT ON RA   -CD Pt sitting EOB with nursing, wanting to go to bathroom,pt with facial droop; son present  -AN    Pertinent History Of Current Problem PT TO OSH  WITH 2 DAY H/O SEVERE VERTIGO AND VIOLENT N&V, WAS DISCHARGED HOME WITH DIAGNOSIS OF GASTROENTERITIS AND CONITINUED TO HAVE SYMPTOMS. PT WENT TO PCP AND MRI REVEALED L CEREBELLAR INFARCT.  PT RETURNED TO OSH AND WAS TRANSFERRED TO [facility] FOR HIGHER LOC.   -CD Pt. went to OSH 1 wk ago with dizzines, n/v, severe HA and dx with gastroentrinits; symptoms persisted along with fall and vision changes and PCP ordered MRI, found to have cerebellar CVA  -AN    Precautions/Limitations fall precautions   ATAXIA.   -CD fall precautions  -AN    Prior Level of Function independent:;community mobility;all household mobility;ADL's;driving  -CD independent:;gait;transfer;ADL's;bed mobility;driving   likes to fish  -AN    Equipment Currently Used at Home shower chair;grab bar   RECENTLY USING R WALKER WITH ONSET OF STROKE SYMPTOMS.   -CD grab bar;shower chair   RW at home used this week  -AN    Plans/Goals Discussed With patient and family;agreed upon  -CD patient and family;agreed upon  -AN    Risks Reviewed patient and family:;nausea/vomiting;dizziness;change in vital signs;increased discomfort;LOB  -CD patient and family:;LOB;nausea/vomiting;dizziness;increased discomfort;change in vital signs  -AN    Benefits Reviewed patient and family:;improve function;increase independence;increase strength;increase balance;increase knowledge  -CD patient and family:;improve function;increase independence;increase strength;increase balance  -AN    Barriers to Rehab none identified  -CD none identified  -AN    Living Environment    Lives With spouse  -CD spouse;other (see comments)   Granddaughter  -AN    Living Arrangements house  -CD house  -AN    Home Accessibility ramps present at home;tub/shower is not walk in  -CD ramps present at home  -AN    Living Environment Comment PT IS RETIRED AND LIKES TO FISH.  -CD     Clinical Impression    Date of Referral to PT 08/03/17  -CD     PT Diagnosis IMPAIRED FUNCTIONAL MOBILITY, CVA.   -CD     Patient/Family Goals Statement TO GO HOME. AGREES TO OPPT.   -CD     Criteria for Skilled Therapeutic Interventions Met yes  -CD     Rehab Potential good, to achieve stated therapy goals  -CD      Vital Signs    Pre Systolic BP Rehab 163  -  -AN    Pre Treatment Diastolic BP 81  -CD 91  -AN    Intra Systolic BP Rehab  163  -AN    Intra Treatment Diastolic BP  81  -AN    Post Systolic BP Rehab 166  -  -AN    Post Treatment Diastolic BP 87  -CD 87  -AN    Pretreatment Heart Rate (beats/min) 109  -  -AN    Posttreatment Heart Rate (beats/min) 101  -CD 95  -AN    Pre SpO2 (%) 96  -CD     O2 Delivery Pre Treatment room air  -CD     Post SpO2 (%) 93  -CD 93  -AN    O2 Delivery Post Treatment room air  -CD room air  -AN    Pre Patient Position  Supine  -AN    Intra Patient Position  Standing  -AN    Post Patient Position  Sitting  -AN    Pain Assessment    Pain Assessment Bonilla-Hauser FACES  -CD Bonilla-Baker FACES  -AN    Bonilla-Hauser FACES Pain Rating 2  -CD     Post Pain Score  2  -AN    Pain Type Chronic pain   0/10 POST.   -CD Acute pain  -AN    Pain Location Back  -CD Neck  -AN    Pain Intervention(s) Repositioned;Ambulation/increased activity  -CD     Response to Interventions TOLERATED  -CD     Vision Assessment/Intervention    Visual Impairment  visual impairment, left;nystagmus  -AN    Vision Comment --   SEE O.T. NOTE.   -CD Decreased gaze and accurate tracking on L; nystagmus brief at L  -AN    Cognitive Assessment/Intervention    Current Cognitive/Communication Assessment functional  -CD functional  -AN    Orientation Status oriented x 4  -CD oriented x 4  -AN    Follows Commands/Answers Questions 100% of the time  -% of the time  -AN    Personal Safety impulsive;mild impairment;decreased awareness, need for assist;decreased awareness, need for safety;decreased insight to deficits  -CD impulsive;mild impairment;decreased awareness, need for safety;decreased awareness, need for assist  -AN    Personal Safety Interventions fall prevention program maintained;gait belt;muscle strengthening facilitated;nonskid shoes/slippers when out of bed;supervised activity  -CD fall prevention program  maintained  -AN    ROM (Range of Motion)    General ROM lower extremity range of motion deficits identified   LIMITED HIP FLEXION IN SITTING DUE TO OBESITY/BACK PAIN.   -CD no range of motion deficits identified  -AN    MMT (Manual Muscle Testing)    General MMT Assessment lower extremity strength deficits identified   R LE 5/5 DF, KNEE FLEX/EXT, 4/5 HIP, L LE 4/5 HIP/KNEE, DF 5  -CD upper extremity strength deficits identified  -AN    General MMT Assessment Detail  L 4/5, R 4+/5  -AN    Bed Mobility, Assessment/Treatment    Bed Mobility, Comment PT SITTING EOB UPON ARRIVAL.   -CD pt sitting EOB with nursing upon  arrival  -AN    Transfer Assessment/Treatment    Transfers, Sit-Stand [place] contact guard assist  -CD contact guard assist;2 person assist required  -AN    Transfers, Stand-Sit [place] contact guard assist  -CD verbal cues required;contact guard assist;2 person assist required  -AN    Transfers, Sit-Stand-Sit, Assist Device --   R UE SUPPORT, GAIT BELT.   -CD     Toilet Transfer, Union  contact guard assist;verbal cues required  -AN    Transfer, Safety Issues  step length decreased  -AN    Transfer, Impairments  impaired balance  -AN    Transfer, Comment PT IMPULSIVE WITH STANDING FROM EOB AND COMMODE.   -CD pt. with wide LUPE, needs assist to steady  -AN    Gait Assessment/Treatment    Gait, [place] Level contact guard assist  -CD     Gait, Assistive Device rolling walker  -CD     Gait, Distance (Feet) 300  -CD     Gait, Gait Deviations ataxia;chandler decreased;step length decreased;stride width increased   INITIALLY WITH HHA THEN SWITCHED TO R WALKER FOR STABILITY.   -CD     Gait, Safety Issues weight-shifting ability decreased;step length decreased;balance decreased during turns  -CD     Gait, Impairments strength decreased;impaired balance;motor control impaired;coordination impaired  -CD     Motor Skills/Interventions    Additional Documentation  Balance Skills Training  (Group);Fine Motor Coordination Training (Group);Gross Motor Coordination Training (Group)  -AN    Balance Skills Training    Sitting-Level of Assistance  Close supervision  -AN    Sitting-Balance Support  Feet supported  -AN    Sitting-Balance Activities  Lateral lean  -AN    Sitting # of Minutes  6  -AN    Standing-Level of Assistance  Contact guard;x2  -AN    Static Standing Balance Support  assistive device  -AN    Standing-Balance Activities  Reaching for objects  -AN    Standing Balance # of Minutes  2  -AN    Fine Motor Coordination Training    Detail (Fine Motor Coordination Training)  mild impairment L UE  -AN    Gross Motor Coordination Training    Gross Motor Skill, Impairments Detail  impaired L finger to nose  -AN    Sensory Assessment/Intervention    Light Touch LLE;RLE   INTACT.   -CD     Positioning and Restraints    Pre-Treatment Position standing in room  -CD in bed  -AN    Post Treatment Position chair  -CD chair  -AN    In Chair sitting;call light within reach;exit alarm on;encouraged to call for assist;with family/caregiver;notified nsg  -CD notified nsg;reclined;call light within reach;encouraged to call for assist;with family/caregiver;exit alarm on  -AN      08/03/17 2349 08/03/17 2346    General Information    Equipment Currently Used at Home  none  -EN    Living Environment    Lives With significant other;child(melissa), dependent  -EN     Living Arrangements house  -EN     Home Accessibility no concerns  -EN     Stair Railings at Home none  -EN     Type of Financial/Environmental Concern none  -EN     Transportation Available car  -EN       User Key  (r) = Recorded By, (t) = Taken By, (c) = Cosigned By    Initials Name Provider Type    REYNALDO Eli, PT Physical Therapist    NICK Shahid, OT Occupational Therapist    TRENT Barahona RN Registered Nurse          Physical Therapy Education     Title: PT OT SLP Therapies (Active)     Topic: Physical Therapy (Done)     Point:  Mobility training (Done)    Learning Progress Summary    Learner Readiness Method Response Comment Documented by Status   Patient Acceptance E VU,NR BENEFITS OF OOB ACTIVITY, SAFETY WITH MOBILITY, D/C PLANNING.  08/04/17 0938 Done   Family Acceptance E VU,NR BENEFITS OF OOB ACTIVITY, SAFETY WITH MOBILITY, D/C PLANNING.  08/04/17 0938 Done               Point: Home exercise program (Done)    Learning Progress Summary    Learner Readiness Method Response Comment Documented by Status   Patient Acceptance E VU,NR BENEFITS OF OOB ACTIVITY, SAFETY WITH MOBILITY, D/C PLANNING.  08/04/17 0938 Done   Family Acceptance E VU,NR BENEFITS OF OOB ACTIVITY, SAFETY WITH MOBILITY, D/C PLANNING.  08/04/17 0938 Done               Point: Body mechanics (Done)    Learning Progress Summary    Learner Readiness Method Response Comment Documented by Status   Patient Acceptance E VU,NR BENEFITS OF OOB ACTIVITY, SAFETY WITH MOBILITY, D/C PLANNING.  08/04/17 0938 Done   Family Acceptance E VU,NR BENEFITS OF OOB ACTIVITY, SAFETY WITH MOBILITY, D/C PLANNING.  08/04/17 0938 Done               Point: Precautions (Done)    Learning Progress Summary    Learner Readiness Method Response Comment Documented by Status   Patient Acceptance E VU,NR BENEFITS OF OOB ACTIVITY, SAFETY WITH MOBILITY, D/C PLANNING.  08/04/17 0938 Done   Family Acceptance E VU,NR BENEFITS OF OOB ACTIVITY, SAFETY WITH MOBILITY, D/C PLANNING.  08/04/17 0938 Done                      User Key     Initials Effective Dates Name Provider Type Discipline     06/19/15 -  Shanna Eli, PT Physical Therapist PT                PT Recommendation and Plan  Anticipated Discharge Disposition: home with assist, home with outpatient services  Planned Therapy Interventions: balance training, patient/family education, home exercise program, gait training, transfer training, strengthening  PT Frequency: daily  Plan of Care Review  Plan Of Care Reviewed With: patient  Outcome  Summary/Follow up Plan: PT PRESENTS WITH DECLINE IN FUNCTIONAL MOBILITY AND EVOLVING SYMPTOMS S/P CVA. WILL BENEFIT FROM P.T. TO INCREASE INDEPENDENCE/SAFETY PRIOR TO D/C HOME. RECOMMEND HOME WITH FAMILY AND OPPT AT D/C.           IP PT Goals       08/04/17 0939          Transfer Training PT LTG    Transfer Training PT LTG, Time to Achieve 2 wks  -CD      Transfer Training PT LTG, Activity Type all transfers  -CD      Transfer Training PT LTG, [place] Level independent  -CD      Transfer Training PT LTG, Assist Device walker, rolling  -CD      Gait Training PT LTG    Gait Training Goal PT LTG, Time to Achieve 2 wks  -CD      Gait Training Goal PT LTG, [place] Level independent  -CD      Gait Training Goal PT LTG, Assist Device walker, rolling  -CD      Gait Training Goal PT LTG, Distance to Achieve 600  -CD      Patient Education PT LTG    Patient Education PT LTG, Time to Achieve 2 wks  -CD      Patient Education PT LTG, Education Type written program;benefits of activity;home safety  -CD      Patient Education PT LTG, Education Understanding demonstrate adequately;verbalize understanding  -CD        User Key  (r) = Recorded By, (t) = Taken By, (c) = Cosigned By    Initials Name Provider Type    CD Shanna Eli, PT Physical Therapist                Outcome Measures       08/04/17 0835 08/04/17 0830       How much help from another person do you currently need...    Turning from your back to your side while in flat bed without using bedrails?  4  -CD     Moving from lying on back to sitting on the side of a flat bed without bedrails?  3  -CD     Moving to and from a bed to a chair (including a wheelchair)?  3  -CD     Standing up from a chair using your arms (e.g., wheelchair, bedside chair)?  3  -CD     Climbing 3-5 steps with a railing?  2  -CD     To walk in hospital room?  3  -CD     AM-PAC 6 Clicks Score  18  -CD     How much help from another is currently needed...    Putting on and taking off  regular lower body clothing? 3  -AN      Bathing (including washing, rinsing, and drying) 3  -AN      Toileting (which includes using toilet bed pan or urinal) 4  -AN      Putting on and taking off regular upper body clothing 4  -AN      Taking care of personal grooming (such as brushing teeth) 4  -AN      Eating meals 4  -AN      Score 22  -AN      Modified Trinity Scale    Modified Trinity Scale 3 - Moderate disability.  Requiring some help, but able to walk without assistance.  -AN 3 - Moderate disability.  Requiring some help, but able to walk without assistance.  -CD     Functional Assessment    Outcome Measure Options AM-PAC 6 Clicks Daily Activity (OT);Modified Trinity  -AN AM-PAC 6 Clicks Basic Mobility (PT);Modified Trinity  -CD       User Key  (r) = Recorded By, (t) = Taken By, (c) = Cosigned By    Initials Name Provider Type    REYNALDO Eli, PT Physical Therapist    NICK Shahid, OT Occupational Therapist           Time Calculation:         PT Charges       08/04/17 0944          Time Calculation    PT Received On 08/04/17  -      PT Goal Re-Cert Due Date 08/14/17  -        User Key  (r) = Recorded By, (t) = Taken By, (c) = Cosigned By    Initials Name Provider Type    REYNALDO Eli, PT Physical Therapist          Therapy Charges for Today     Code Description Service Date Service Provider Modifiers Qty    [id number]  PT EVAL MOD COMPLEXITY 4 8/4/2017 Shanna Eli, PT GP 1          PT G-Codes  Outcome Measure Options: AM-PAC 6 Clicks Daily Activity (OT), Bettina Eli PT  8/4/2017

## 2017-08-04 NOTE — PLAN OF CARE
Problem: Patient Care Overview (Adult)  Goal: Plan of Care Review  Outcome: Ongoing (interventions implemented as appropriate)    08/04/17 0939   Coping/Psychosocial Response Interventions   Plan Of Care Reviewed With patient   Outcome Evaluation   Outcome Summary/Follow up Plan PT PRESENTS WITH DECLINE IN FUNCTIONAL MOBILITY AND EVOLVING SYMPTOMS S/P CVA. WILL BENEFIT FROM P.T. TO INCREASE INDEPENDENCE/SAFETY PRIOR TO D/C HOME. RECOMMEND HOME WITH FAMILY AND OPPT AT D/C.          Problem: Stroke (Ischemic) (Adult)  Goal: Signs and Symptoms of Listed Potential Problems Will be Absent or Manageable (Stroke)  Outcome: Ongoing (interventions implemented as appropriate)    08/04/17 0939   Stroke (Ischemic)   Problems Assessed (Stroke (Ischemic)/TIA) cognitive impairment;motor/sensory impairment;communication impairment   Problems Present (Stroke (Ischemic)/TIA) motor/sensory impairment         Problem: Inpatient Physical Therapy  Goal: Transfer Training Goal 1 LTG- PT  Outcome: Ongoing (interventions implemented as appropriate)    08/04/17 0939   Transfer Training PT LTG   Transfer Training PT LTG, Time to Achieve 2 wks   Transfer Training PT LTG, Activity Type all transfers   Transfer Training PT LTG, Check Level independent   Transfer Training PT LTG, Assist Device walker, rolling       Goal: Gait Training Goal LTG- PT  Outcome: Ongoing (interventions implemented as appropriate)    08/04/17 0939   Gait Training PT LTG   Gait Training Goal PT LTG, Time to Achieve 2 wks   Gait Training Goal PT LTG, Check Level independent   Gait Training Goal PT LTG, Assist Device walker, rolling   Gait Training Goal PT LTG, Distance to Achieve 600       Goal: Patient Education Goal LTG- PT  Outcome: Ongoing (interventions implemented as appropriate)    08/04/17 0939   Patient Education PT LTG   Patient Education PT LTG, Time to Achieve 2 wks   Patient Education PT LTG, Education Type written program;benefits of  activity;home safety   Patient Education PT LTG, Education Understanding demonstrate adequately;verbalize understanding

## 2017-08-04 NOTE — PLAN OF CARE
Problem: Patient Care Overview (Adult)  Goal: Plan of Care Review  Outcome: Ongoing (interventions implemented as appropriate)    08/04/17 0328   Coping/Psychosocial Response Interventions   Plan Of Care Reviewed With patient;tommy   Patient Care Overview   Progress no change   Outcome Evaluation   Outcome Summary/Follow up Plan vital signs stable, no complaints of pain, NIH of 0, passed dysphagia, CT completed, assist X1         Problem: Stroke (Ischemic) (Adult)  Goal: Signs and Symptoms of Listed Potential Problems Will be Absent or Manageable (Stroke)  Outcome: Ongoing (interventions implemented as appropriate)    Problem: Fall Risk (Adult)  Goal: Identify Related Risk Factors and Signs and Symptoms  Outcome: Ongoing (interventions implemented as appropriate)

## 2017-08-04 NOTE — H&P
[facility] Medicine Services  HISTORY AND PHYSICAL    Primary Care Physician: Wes Segura MD    Subjective     Chief Complaint:  stroke    History of Present Illness:   Mr. Pack is a very pleasant  gentleman who presents in transfer from [facility] ED with the diagnosis of cerebellar stroke.  On 7/26/2017 when he awakened he experienced severe vertigo and violent nausea and vomiting.  Either that day or the next he presented to an outside ED where he was diagnosed with a viral gastroenteritis and treated with antiemetics and sent home.  Over the course of time he still has altered vision, ataxia and some vertigo.  He has some nausea but no further vomiting.  He was seen by his PCP today who ordered an MRI of the brain which revealed an acute moderate sized infarction of the left cerebellar hemisphere.  There was associated mild edema noted.  The patient was instructed to go to the ER and from [facility] was transferred here for a higher level of care.  The patient has a PMH significant for T2DM, HTN, 2 strokes in the past without residual deficit, back pain and BPH.        Review of Systems   Constitutional: Positive for activity change and appetite change.   HENT: Negative.    Eyes: Positive for visual disturbance.   Respiratory: Negative.    Cardiovascular: Negative.    Gastrointestinal: Positive for nausea and vomiting. Negative for diarrhea.   Endocrine: Negative.    Genitourinary: Negative.    Musculoskeletal: Negative.    Skin: Negative.    Allergic/Immunologic: Negative.    Neurological: Positive for dizziness.   Hematological: Negative.    Psychiatric/Behavioral: Negative.       Otherwise complete ROS performed and negative except as mentioned in the HPI.    Past Medical History:   Diagnosis Date   • BPH (benign prostatic hyperplasia) 6 years   • Chronic back pain    • Diabetes mellitus    • Hypertension    • Stroke        History reviewed. No pertinent surgical  "history.    History reviewed. No pertinent family history.    Social History     Social History   • Marital status:      Spouse name: N/A   • Number of children: N/A   • Years of education: N/A     Occupational History   • Not on file.     Social History Main Topics   • Smoking status: Never Smoker   • Smokeless tobacco: Former User     Types: Chew     Quit date: 8/3/2011   • Alcohol use No   • Drug use: No   • Sexual activity: Defer     Other Topics Concern   • Not on file     Social History Narrative       Medications:  Prescriptions Prior to Admission   Medication Sig Dispense Refill Last Dose   • amLODIPine (NORVASC) 10 MG tablet Take 10 mg by mouth Daily.   8/3/2017 at Unknown time   • aspirin 81 MG chewable tablet Chew 81 mg Daily.   8/3/2017 at Unknown time   • glipiZIDE (GLUCOTROL) 10 MG tablet Take 10 mg by mouth 2 (Two) Times a Day Before Meals.   8/3/2017 at Unknown time   • HYDROcodone-acetaminophen (NORCO) 7.5-325 MG per tablet Take 1 tablet by mouth 3 (Three) Times a Day.   8/3/2017 at Unknown time   • meclizine (ANTIVERT) 25 MG tablet Take 25 mg by mouth 3 (Three) Times a Day As Needed for dizziness.   8/2/2017 at Unknown time   • ramipril (ALTACE) 10 MG capsule Take 10 mg by mouth 2 (Two) Times a Day.   8/3/2017 at Unknown time   • SITagliptin (JANUVIA) 100 MG tablet Take 100 mg by mouth 2 (Two) Times a Day.   8/3/2017 at Unknown time   • tamsulosin (FLOMAX) 0.4 MG capsule 24 hr capsule Take 1 capsule by mouth Every Morning.   8/3/2017 at Unknown time   • triamterene-hydrochlorothiazide (DYAZIDE) 37.5-25 MG per capsule Take 1 capsule by mouth Daily.   8/3/2017 at Unknown time       Allergies:  No Known Allergies      Objective     Physical Exam:  Vital Signs: Ht 69\" (175.3 cm)  Wt 288 lb 14.4 oz (131 kg)  BMI 42.66 kg/m2  Physical Exam     Constitutional: no acute distress, awake, alert  Eyes: PERRLA, sclerae anicteric, no conjunctival injection  Neck: supple, no thyromegaly, trachea " midline  Respiratory: Clear to auscultation bilaterally, nonlabored respirations   Cardiovascular: RRR, no murmurs, rubs, or gallops, palpable pedal pulses bilaterally  Gastrointestinal: Positive bowel sounds, soft, nontender, nondistended, obese  Musculoskeletal: No bilateral ankle edema, no clubbing or cyanosis to bilateral lower extremities  Psychiatric: oriented x 3, appropriate affect, cooperative  Neurologic: Right eyelid ptosis, Strength symmetric in all extremities, Cranial Nerves grossly intact to confrontation     Results Reviewed:    Results from last 7 days  Lab Units 08/04/17  0053   WBC 10*3/mm3 7.23   HEMOGLOBIN g/dL 14.6   PLATELETS 10*3/mm3 239          Imaging Results (last 24 hours)     Procedure Component Value Units Date/Time    CT Head Without Contrast [[id number]]  (Abnormal) Collected:  08/03/17 2355     Updated:  08/04/17 0049    Narrative:       EXAM:  CT Head Without Intravenous Contrast    CLINICAL HISTORY:  67 years old, male; Signs and symptoms; Other: Known cerebellar CVA; Additional   info: Cerebellar stroke    TECHNIQUE:  Axial computed tomography images of the head/brain without intravenous   contrast.  This CT exam was performed using one or more of the following dose   reduction techniques:  automated exposure control, adjustment of the mA and/or   kV according to patient size, and/or use of iterative reconstruction technique.    COMPARISON:  No relevant prior studies available.    FINDINGS:  Brain:  Small area of hypoattenuation within the central aspect of the right   cerebellar hemisphere, possibly subacute/evolving infarction.  Abnormal areas   of low attenuation within the left cerebellum, most compatible with prior   infarctions.  Minimal encephalomalacia within the right parietal and occipital   lobes.  Ventricles:  Normal.  Bones/joints:  Normal.  No acute fracture.  Soft tissues:  Normal.  Vasculature:  Atherosclerotic vascular calcifications.  Sinuses:  Minimal ethmoid  and bilateral maxillary sinus disease.  Mastoid air cells:  Normal as visualized.  No mastoid effusion.      Impression:         1.  Small area of hypoattenuation within the central aspect of the right   cerebellar hemisphere, possibly subacute/evolving infarction.  Recommend   further evaluation.    2.  Incidental/non-acute findings are described above.    THIS DOCUMENT HAS BEEN ELECTRONICALLY SIGNED BY BENEDICT RAVI MD            I have personally reviewed and interpreted available lab data, radiology studies and ECG obtained at time of admission.     Labs  Mri report  EKG    Assessment / Plan     Problem List:   Hospital Problem List     Cerebellar stroke    Type 2 diabetes mellitus with complication, without long-term current use of insulin (Chronic)    Hypertension (Chronic)          Plan:  Cerebellar stroke (left hemisphere)  --MRI report reviewed, disc included in patient's chart  --Mild edema noted  --ECHO and carotids  --Neuro and neurosurg consultations  --Q4 hour neuro checks  --High dose lipitor, continue ASA  --Stroke order set utilized    T2DM:  --Hold orals  --A1C  --Accuchecks and SSI    HTN:  --Continue home meds      DVT prophylaxis: Mechanical  Code Status: AND full support  Admission Status: Patient will be admitted to  [facility]: Patient with acute cerebella stroke with mild edema requiring advanced workup and further treatment, multispecialty consultation.  Diabetes complicates all aspects of his care.     Mehreen Donohue MD 08/04/17 1:19 AM

## 2017-08-04 NOTE — PLAN OF CARE
Problem: Patient Care Overview (Adult)  Goal: Plan of Care Review  Outcome: Outcome(s) achieved Date Met:  08/04/17 08/04/17 0945   Coping/Psychosocial Response Interventions   Plan Of Care Reviewed With patient;son   Outcome Evaluation   Outcome Summary/Follow up Plan Pt presents with balance and L coordiantion/strength deficits leading to CG assist needed with ADL's and mobility. Pt with minimal PMH which should not hinder oupt tx of deficits. Recommned home with outpt.          Problem: Stroke (Ischemic) (Adult)  Goal: Signs and Symptoms of Listed Potential Problems Will be Absent or Manageable (Stroke)  Outcome: Ongoing (interventions implemented as appropriate)    08/04/17 0945   Stroke (Ischemic)   Problems Assessed (Stroke (Ischemic)/TIA) communication impairment;motor/sensory impairment;cognitive impairment;muscle tone abnormal   Problems Present (Stroke (Ischemic)/TIA) motor/sensory impairment         Problem: Inpatient Occupational Therapy  Goal: Transfer Training Goal 1 LTG- OT  Outcome: Ongoing (interventions implemented as appropriate)    08/04/17 0945   Transfer Training OT LTG   Transfer Training OT LTG, Date Established 08/04/17   Transfer Training OT LTG, Time to Achieve 1 wk   Transfer Training OT LTG, Activity Type all transfers   Transfer Training OT LTG, Palmer Level set up required       Goal: Bathing Goal LTG- OT  Outcome: Ongoing (interventions implemented as appropriate)    08/04/17 0945   Bathing OT LTG   Bathing Goal OT LTG, Date Established 08/04/17   Bathing Goal OT LTG, Time to Achieve 1 wk   Bathing Goal OT LTG, Activity Type simulates;upper body bathing;lower body bathing   Bathing Goal OT LTG, Palmer Level conditional independence   Bathing Goal OT LTG, Assist Device shower chair       Goal: Safety Awareness Goal LTG- OT  Outcome: Ongoing (interventions implemented as appropriate)    08/04/17 0945   Safety Awareness OT LTG   Safety Awareness OT LTG, Date Established  08/04/17   Safety Awareness OT LTG, Time to Achieve 1 wk   Safety Awareness OT LTG, Activity Type good safety awareness;with ADL's;with home mgmt task       Goal: Coordination Goal LTG- OT  Outcome: Ongoing (interventions implemented as appropriate)    08/04/17 0945   Coordination OT LTG   Coordination OT LTG, Date Established 08/04/17   Coordination OT LTG, Time to Achieve 1 wk   Coordination OT LTG, Activity Type GM task;FM task   Coordination OT LTG, Shushan Level standby assist

## 2017-08-04 NOTE — CONSULTS
"Adult Nutrition  Assessment/PES    Patient Name:  Rafy Pack  YOB: 1950  MRN: [id number]  Admit Date:  8/3/2017    Assessment Date:  8/4/2017        Reason for Assessment       08/04/17 1425    Reason for Assessment    Reason For Assessment/Visit identified at risk by screening criteria;nurse/nurse practitioner consult    Identified At Risk By Screening Criteria MST SCORE 2+    Time Spent (min) 20    Diagnosis Diagnosis    Cardiac HTN    Endocrine DM Type 2    Neurological --   cerebellar stroke L sided              Nutrition/Diet History       08/04/17 1426    Nutrition/Diet History    Reported/Observed By Patient;Family    Other Patient reports some weight loss in the past month. Reports UBW ~298lbs, has been this weight for a while prior to recent weight loss. States his appetite has been \"blah\" the past month, sometimes he would eat well for meals, other times he would eat less than 50% of plate. Patient reports recently had nausea/vomiting about a week ago but that has since resolved. Appetite has been good since admission, patient was eating lunch at time of visit and has menu to choose selections with [employer].            Anthropometrics       08/04/17 1429    Anthropometrics (Special Considerations)    Height Used for Calculations 1.753 m (5' 9\")    Weight Used for Calculations 131 kg (288 lb 12.8 oz)   bed scale weight per previous charting    Usual Body Weight (UBW)    Usual Body Weight 135 kg (298 lb)   reported by patient    Weight Loss 4.536 kg (10 lb)    % Weight Loss  3.4 %    Weight Loss Time Frame ~ 1 month            Labs/Tests/Procedures/Meds       08/04/17 1430    Labs/Tests/Procedures/Meds    Labs/Tests Review Reviewed                Nutrition Prescription Ordered       08/04/17 1430    Nutrition Prescription PO    Current PO Diet Regular    Common Modifiers Cardiac            Evaluation of Received Nutrient/Fluid Intake       08/04/17 1430    PO Evaluation    " Number of Days PO Intake Evaluated --   PO intake recorded from 8/4    Number of Meals 3    % PO Intake 67%              Problem/Interventions:        Problem 1       08/04/17 1430    Nutrition Diagnoses Problem 1    Problem 1 Unintended Weight Loss    Etiology (related to) --   clinical condition    Signs/Symptoms (evidenced by) Unintended Weight Change    Unintended Weight Change Loss    Number of Pounds Lost 10 lb    Weight loss time period ~ 1 month                    Intervention Goal       08/04/17 1431    Intervention Goal    General Nutrition support treatment            Nutrition Intervention       08/04/17 1431    Nutrition Intervention    RD/Tech Action Encourage intake;Menu provided;Advise alternate selection;Follow Tx progress;Care plan reviewd              Education/Evaluation       08/04/17 1432    Monitor/Evaluation    Monitor Per protocol;PO intake          Electronically signed by:  Adali Cheema  08/04/17 2:32 PM

## 2017-08-04 NOTE — CONSULTS
"Diabetes Education  Assessment/Teaching    Patient Name:  Rafy Pack  YOB: 1950  MRN: [id number]  Admit Date:  8/3/2017      Assessment Date:  8/4/2017       Most Recent Value    General Information      Referral From:  A1c, Blood glucose, MD order    Height  5' 9\" (1.753 m)    Height Method  Stated    Weight  288 lb 14.4 oz (131 kg)    Weight Method  Bed scale    Pregnancy Assessment     Diabetes History     What type of diabetes do you have?  Type 2    Length of Diabetes Diagnosis  6 - 10 years    Current DM knowledge  fair    Have you had diabetes education/teaching in the past?  no    Do you test your blood sugar at home?  yes    Frequency of checks  Was occasionally. Last 3 weeks has been 3 to 4 times daily    Meter type  Ultra Touch    Who performs the test?  Self    Typical readings  128 to 260s    Have you had low blood sugar? (<70mg/dl)  no    Have you had high blood sugar? (>140mg/dl)  yes    How often do you have high blood sugar?  frequently    When was your last high blood sugar?  Yesterday 355    How would you rate your diabetes control?  poor    Education Preferences     What areas of diabetes would you like to learn about?  medications for diabetes, diabetes complications, avoiding high blood sugar    Nutrition Information     Assessment Topics     Healthy Eating - Assessment  Needs education    Being Active - Assessment  Needs education    Taking Medication - Assessment  Competent    Problem Solving - Assessment  Needs education    Reducing Risk - Assessment  Needs education    Healthy Coping - Assessment  Needs education    Monitoring - Assessment  Competent    DM Goals     Healthy Eating - Goal  0-7 days from discharge    Taking Medication - Goal  0-7 days from discharge    Monitoring - Goal  0-7 days from discharge    Contact Plan  Follow-up medical care, Phone call               Most Recent Value    DM Education Needs     Meter  Has own    Meter Type  One Touch    Frequency of " Testing  3 times a day    Blood Glucose Target Range  80 to 130    Medication  Oral, Insulin [Started on insulin yesterday per PCP,  has not picked up from pharmacy yet]    Problem Solving  Hypoglycemia, Hyperglycemia, Sick days, Signs, Symptoms, Treatment    Reducing Risks  A1C testing, Lipids, Blood pressure, Eye exam, Dental exam, Foot care, Immunizations, Cardiovascular, Retinopathy, Neuropathy    Physical Activity  None    Physical Activity Frequency  Never    Healthy Coping  Appropriate    Gestational  Diagnosis    Motivation  Other (comment) [Fair]    Teaching Method  Explanation, Discussion, Demonstration, Handouts, Teach back    Patient Response  Verbalized understanding, Needs reinforcement, Demonstrates adequately        Mr. Pack has been Type II diabetic for 6+ years. He had been taking his BG occasionally, but taking 3 to 4 times daily for past 3 weeks. Results from 128 to 355.He saw his PCP yesterday who put him on a long acting insulin. He has been on glipizide and januvia. He was admitted to hospital prior to picking up the insulin from his pharmacy. He states his BG levels have been high for several years, but he had refused to be on insulin. He states he is now ready to start taking insulin.    Discussed and taught patient about type 2 diabetes self-management, risk factors, and importance of blood glucose control to reduce complications. Target blood glucose readings and A1c goals per ADA were reviewed. Reviewed with patient current A1c of 12.5 and discussed its significance. Signs, symptoms and treatment of hyperglycemia and hypoglycemia were discussed. Lifestyle changes such as physical activity with MD approval and healthy eating were encouraged. Pt instructed to  check blood sugar 4 times per day and to call PCP if  Blood glucose is higher than 180 two times or more.  Patient was encouraged to keep record of blood glucose readings to take to follow up appointment with PCP.      Discussed and  taught patient about current hospital insulins including the onset, peak, and duration of the insulins. Taught patient the correct sites for insulin injections and the importance of rotating insulin injection site. Taught patient the correct storage for opened insulin at room temperature, storage for unopened insulin in the refrigerator, and expiration dates for insulin. Also, discussed and taught patient the correct disposable of sharps.  Demonstrated and taught patient how to use demo pen with appropriate injection technique on the demo pillow. Patient was able to return demonstration without difficulty. Discussed and taught patient the s/s of hypoglycemia and treatment measures for hypoglycemia. Patient was encouraged to follow up with their primary caregiver and/or local pharmacist for any questions or concerns.       Electronically signed by:  Nancy Beltre RN  08/04/17 12:09 PM

## 2017-08-04 NOTE — CONSULTS
"Subjective     CC: stroke    History of Present Illness   Rafy Pack is a 67 y.o. male is seen today in consultation at the request of Dr. Donohue for stroke. He developed sudden onset vertigo and ataxia on 7/26/17. This has improved significantly, and he now notes only mild intermittent vertigo. He denies gait ataxia or limb ataxia. There are no other associated symptoms or modifying factors noted.    He was seen locally, and MRI showed stroke and he was transferred here.    The following portions of the patient's history were reviewed and updated as appropriate: allergies, current medications, past family history, past medical history, past social history, past surgical history and problem list.    I reviewed admission PFSH from his admission dated 8/3/17.  FH is notable for a mother with stroke.    Review of Systems   Constitutional: Negative.    Respiratory: Negative.    Cardiovascular: Negative.    Gastrointestinal: Negative.    Genitourinary: Negative.    All other systems reviewed and are negative.      Objective   General appearance today is normal.   Peripheral pulses were present and symmetric.   The ophthalmoscopic exam today is unremarkable. This discs and posterior elements are unremarkable.    /88 (BP Location: Left arm, Patient Position: Lying)  Pulse 85  Temp 96.8 °F (36 °C) (Oral)   Resp 18  Ht 69\" (175.3 cm)  Wt 288 lb 14.4 oz (131 kg)  SpO2 93%  BMI 42.66 kg/m2    Physical Exam   Constitutional: He is oriented to person, place, and time.   Neurological: He is oriented to person, place, and time. He has normal strength. He has an abnormal Finger-Nose-Finger Test (mild dysmetria on the left). Gait normal.   Reflex Scores:       Tricep reflexes are 1+ on the right side and 1+ on the left side.       Bicep reflexes are 1+ on the right side and 1+ on the left side.       Brachioradialis reflexes are 1+ on the right side and 1+ on the left side.       Patellar reflexes are 1+ on the right " side and 1+ on the left side.       Achilles reflexes are 1+ on the right side and 1+ on the left side.  Psychiatric: His speech is normal.        Neurologic Exam     Mental Status   Oriented to person, place, and time.   Registration: recalls 3 of 3 objects. Recall at 5 minutes: recalls 3 of 3 objects. Follows 3 step commands.   Attention: normal.   Speech: speech is normal   Level of consciousness: alert  Knowledge: good.   Normal comprehension.     Cranial Nerves   Cranial nerves II through XII intact.     Motor Exam   Muscle bulk: normal  Overall muscle tone: normal    Strength   Strength 5/5 throughout.     Sensory Exam   Light touch normal.     Gait, Coordination, and Reflexes     Gait  Gait: normal    Coordination   Finger to nose coordination: abnormal (mild dysmetria on the left)    Reflexes   Right brachioradialis: 1+  Left brachioradialis: 1+  Right biceps: 1+  Left biceps: 1+  Right triceps: 1+  Left triceps: 1+  Right patellar: 1+  Left patellar: 1+  Right achilles: 1+  Left achilles: 1+  Right plantar: normal  Left plantar: normal      Laboratory and radiological testing are notable for an [facility] from [facility] which is consistent with a subacute left cerebellar infarct. Head CT here is also consistent with stroke (I reviewed MR and CT images personally). Labs include normal LFT's and CBC.       Assessment/Plan     Rafy Pack is admitted with subacute stroke. I think it reasonable to continue ASA at 325 mg daily as started upon admission, along with atorvastatin for now. I will await his ECHO and Carotid Dopplers. I will request an MRA of the brain to better assess his posterior circulation, though I anticipate that he will best be managed medically. I raised the possibility of adding or switching to Plavix in the future. Pending his studies, I would anticipate discharge possibly within the next 24 hours if he continues to do well clinically.    As part of this visit I reviewed prior lab  results, reviewed radiology results, reviewed radiology images and reviewed records from the current hospitalization which is incorporated in the HPI. Please see above for details.

## 2017-08-05 VITALS
OXYGEN SATURATION: 97 % | WEIGHT: 288.9 LBS | SYSTOLIC BLOOD PRESSURE: 153 MMHG | RESPIRATION RATE: 16 BRPM | HEART RATE: 82 BPM | HEIGHT: 69 IN | BODY MASS INDEX: 42.79 KG/M2 | TEMPERATURE: 96.9 F | DIASTOLIC BLOOD PRESSURE: 90 MMHG

## 2017-08-05 LAB
GLUCOSE BLDC GLUCOMTR-MCNC: 250 MG/DL (ref 70–130)
GLUCOSE BLDC GLUCOMTR-MCNC: 339 MG/DL (ref 70–130)

## 2017-08-05 PROCEDURE — 99239 HOSP IP/OBS DSCHRG MGMT >30: CPT | Performed by: NURSE PRACTITIONER

## 2017-08-05 PROCEDURE — 82962 GLUCOSE BLOOD TEST: CPT

## 2017-08-05 RX ORDER — CLOPIDOGREL BISULFATE 75 MG/1
75 TABLET ORAL DAILY
Qty: 30 TABLET | Refills: 1 | Status: SHIPPED | OUTPATIENT
Start: 2017-08-05

## 2017-08-05 RX ORDER — PEN NEEDLE, DIABETIC 30 GX3/16"
1 NEEDLE, DISPOSABLE MISCELLANEOUS 4 TIMES DAILY
Qty: 120 EACH | Refills: 0 | Status: SHIPPED | OUTPATIENT
Start: 2017-08-05

## 2017-08-05 RX ORDER — ATORVASTATIN CALCIUM 80 MG/1
80 TABLET, FILM COATED ORAL NIGHTLY
Qty: 30 TABLET | Refills: 2 | Status: SHIPPED | OUTPATIENT
Start: 2017-08-05

## 2017-08-05 RX ADMIN — TAMSULOSIN HYDROCHLORIDE 0.4 MG: 0.4 CAPSULE ORAL at 09:33

## 2017-08-05 RX ADMIN — ASPIRIN 325 MG ORAL TABLET 325 MG: 325 PILL ORAL at 09:33

## 2017-08-05 RX ADMIN — INSULIN LISPRO 4 UNITS: 100 INJECTION, SOLUTION INTRAVENOUS; SUBCUTANEOUS at 09:37

## 2017-08-05 RX ADMIN — TRIAMTERENE AND HYDROCHLOROTHIAZIDE 1 TABLET: 37.5; 25 TABLET ORAL at 09:33

## 2017-08-05 RX ADMIN — INSULIN LISPRO 5 UNITS: 100 INJECTION, SOLUTION INTRAVENOUS; SUBCUTANEOUS at 12:36

## 2017-08-05 RX ADMIN — HYDROCODONE BITARTRATE AND ACETAMINOPHEN 1 TABLET: 7.5; 325 TABLET ORAL at 05:31

## 2017-08-05 RX ADMIN — AMLODIPINE BESYLATE 10 MG: 10 TABLET ORAL at 09:33

## 2017-08-05 RX ADMIN — RAMIPRIL 10 MG: 5 CAPSULE ORAL at 09:33

## 2017-08-05 NOTE — DISCHARGE SUMMARY
[facility] Medicine Services  DISCHARGE SUMMARY       Date of Admission: 8/3/2017  Date of Discharge:  8/5/2017  Primary Care Physician: Wes Segura MD  Consulting Physician(s)     Provider Relationship    Klaus Smith MD Consulting Physician          Discharge Diagnoses:  Active Hospital Problems (** Indicates Principal Problem)    Diagnosis Date Noted   • Cerebellar stroke [I63.9] 08/04/2017   • Type 2 diabetes mellitus with complication, without long-term current use of insulin [E11.8] 08/04/2017   • Hypertension [I10] 08/04/2017      Resolved Hospital Problems    Diagnosis Date Noted Date Resolved   No resolved problems to display.       Presenting Problem/History of Present Illness  Cerebellar stroke [I63.9]     Chief Complaint on Day of Discharge: wants to go home    History of Present Illness on Day of Discharge: Laying in bed, feels good, strength is returning, wants to go home, no further dizziness. Is agreeable to insulin. Denies chest pain, soa, cough, F/C, abdominal pain, N/VD    Hospital Course  Patient is a 67 y.o. male presented As a cancer from outside facility with the diagnosis of a cerebellar stroke.  A few days prior to admission patient awakened with severe vertigo and violent nausea and vomiting.  He presented to an outside emergency department where he was diagnosed with viral gastroenteritis and sent home.  Over the next few days he continued to experience altered vision, ataxia, and vertigo.  He saw his primary care physician on day of admission who ordered MRI of the brain which revealed an acute moderate sized infarction of his left cerebellum hemisphere.  He was sent to the emergency room at outside hospital and transferred to [facility] for higher level of care.  He is admitted to the hospitalist service with neurology consultation.    He was admitted under stroke protocol and started on aspirin and high-dose statin.  He underwent  "echocardiogram and carotid duplex which were unremarkable.  He had a normal MRA of his head.  His symptoms improved rapidly.  Case discussed with Dr. Benavidez (neurologist) on day of discharge.  She recommends Plavix for a few months.  Continuation of this medication can be discussed at neurology follow-up.    Patient's diabetes has been poorly controlled with hemoglobin A1c of 12.5.  Long discussion with patient concerning insulin and dietary changes..  He is ready to start insulin.  He will need close PCP follow-up for continued titration of insulin to achieve optimal blood sugar goals.     **ADDEDUM 8/5/17 7116**  After patient was discharged his pharmacy called and informed me that he does not have any prescription coverage and cannot afford insulin as it is several hundred dollars. I spoke with case management and unfortunately there are no programs available for insulin. After long discussion with wife, it was recommended that in the interim patient go back on his home regimen of Januvia and Glipizide. He has \"several bottles\" of Metformin at home. Encouraged to restart Metformin and discussed GI side effects. Encouraged close f/u with PCP for long term plan to better treat diabetes.    Procedures Performed         Consults:   Consults     Date and Time Order Name Status Description    8/4/2017 0000 Inpatient Consult to Neurology            Pertinent Test Results:  Results for EVELIN SHELTON (MRN [id number]) as of 8/5/2017 11:28   Ref. Range 8/4/2017 00:53   Glucose Latest Ref Range: 70 - 100 mg/dL 287 (H)   Sodium Latest Ref Range: 132 - 146 mmol/L 130 (L)   Potassium Latest Ref Range: 3.5 - 5.5 mmol/L 3.9   CO2 Latest Ref Range: 20.0 - 31.0 mmol/L 21.0   Chloride Latest Ref Range: 99 - 109 mmol/L 96 (L)   Anion Gap Latest Ref Range: 3.0 - 11.0 mmol/L 13.0 (H)   Creatinine Latest Ref Range: 0.60 - 1.30 mg/dL 0.90   BUN Latest Ref Range: 9 - 23 mg/dL 23   BUN/Creatinine Ratio Latest Ref Range: 7.0 - 25.0  " 25.6 (H)   Calcium Latest Ref Range: 8.7 - 10.4 mg/dL 10.0   eGFR Non African Amer Latest Ref Range: >60 mL/min/1.73 84   Alkaline Phosphatase Latest Ref Range: 25 - 100 U/L 54   Total Protein Latest Ref Range: 5.7 - 8.2 g/dL 7.6   ALT (SGPT) Latest Ref Range: 7 - 40 U/L 18   AST (SGOT) Latest Ref Range: 0 - 33 U/L 15   Total Bilirubin Latest Ref Range: 0.3 - 1.2 mg/dL 1.0   Albumin Latest Ref Range: 3.20 - 4.80 g/dL 4.20   Globulin Latest Units: gm/dL 3.4   A/G Ratio Latest Ref Range: 1.5 - 2.5 g/dL 1.2 (L)   Hemoglobin A1C Latest Ref Range: 4.80 - 5.60 % 12.50 (H)   Total Cholesterol Latest Ref Range: 0 - 200 mg/dL 170   HDL Cholesterol Latest Ref Range: 40 - 60 mg/dL 33 (L)   LDL Cholesterol  Latest Ref Range: 0 - 130 mg/dL 108   Triglycerides Latest Ref Range: 0 - 150 mg/dL 258 (H)   WBC Latest Ref Range: 3.50 - 10.80 10*3/mm3 7.23   RBC Latest Ref Range: 4.20 - 5.76 10*6/mm3 4.85   Hemoglobin Latest Ref Range: 13.1 - 17.5 g/dL 14.6   Hematocrit Latest Ref Range: 38.9 - 50.9 % 43.5   RDW Latest Ref Range: 11.3 - 14.5 % 12.7   MCV Latest Ref Range: 80.0 - 99.0 fL 89.7   MCH Latest Ref Range: 27.0 - 31.0 pg 30.1   MCHC Latest Ref Range: 32.0 - 36.0 g/dL 33.6   MPV Latest Ref Range: 6.0 - 12.0 fL 9.1   Platelets Latest Ref Range: 150 - 450 10*3/mm3 239   RDW-SD Latest Ref Range: 37.0 - 54.0 fl 41.6   EXAM:  CT Head Without Intravenous Contrast     CLINICAL HISTORY:  67 years old, male; Signs and symptoms; Other: Known cerebellar CVA; Additional   info: Cerebellar stroke     TECHNIQUE:  Axial computed tomography images of the head/brain without intravenous   contrast.  This CT exam was performed using one or more of the following dose   reduction techniques:  automated exposure control, adjustment of the mA and/or   kV according to patient size, and/or use of iterative reconstruction technique.     COMPARISON:  No relevant prior studies available.     FINDINGS:  Brain:  Small area of hypoattenuation within the  "central aspect of the right   cerebellar hemisphere, possibly subacute/evolving infarction.  Abnormal areas   of low attenuation within the left cerebellum, most compatible with prior   infarctions.  Minimal encephalomalacia within the right parietal and occipital   lobes.  Ventricles:  Normal.  Bones/joints:  Normal.  No acute fracture.  Soft tissues:  Normal.  Vasculature:  Atherosclerotic vascular calcifications.  Sinuses:  Minimal ethmoid and bilateral maxillary sinus disease.  Mastoid air cells:  Normal as visualized.  No mastoid effusion.     IMPRESSION:     1.  Small area of hypoattenuation within the central aspect of the right   cerebellar hemisphere, possibly subacute/evolving infarction.  Recommend   further evaluation.     2.  Incidental/non-acute findings are described above.     THIS DOCUMENT HAS BEEN ELECTRONICALLY SIGNED BY BENEDICT RAVI MD    EXAMINATION: MRI ANGIOGRAM HEAD WO CONTRAST- 08/04/2017      INDICATION: cerebellar stroke; Z74.09-Other reduced mobility      TECHNIQUE: Intracranial MRA is displayed in the axial, coronal and  \"tumble\" projections (3-D).      COMPARISON: NONE      FINDINGS: The intracranial portion of the internal carotid arteries is  patent. The anterior and middle cerebral artery circulations are normal  with no aneurysm, stenosis or occlusion.  The basilar artery is intact  as are the superior cerebellar and posterior cerebral arteries.      IMPRESSION:  Normal intracranial MRA.      D:  08/04/2017  E:  08/04/2017          This report was finalized on 8/4/2017 3:03 PM by Dr. Casper Nuñez MD.    ECHO:   · Left ventricular systolic function is normal. Estimated EF = 65%.  · The cardiac valves are anatomically and functionally normal.    CAROTIDS:   · Right internal carotid artery stenosis of 0-49%.  · Proximal left internal carotid artery is normal.    Condition on Discharge:  Stable    Physical Exam on Discharge:/90  Pulse 82  Temp 96.9 °F (36.1 °C) (Oral)   Resp 16 " " Ht 69\" (175.3 cm)  Wt 288 lb 14.4 oz (131 kg)  SpO2 97%  BMI 42.66 kg/m2  Physical Exam   Constitutional: He is oriented to person, place, and time. He appears well-nourished. No distress.   HENT:   Head: Normocephalic and atraumatic.   Eyes: Pupils are equal, round, and reactive to light. No scleral icterus.   Neck: Neck supple. No tracheal deviation present.   Cardiovascular: Normal rate, regular rhythm, normal heart sounds and intact distal pulses.    Pulmonary/Chest: Effort normal and breath sounds normal.   Abdominal: Soft. Bowel sounds are normal.   Musculoskeletal: He exhibits no edema.   Neurological: He is alert and oriented to person, place, and time.   Equal , pushes and pulls, face symmetrical   Skin: Skin is warm and dry.   Psychiatric: He has a normal mood and affect. His behavior is normal.         Discharge Disposition  Home or Self Care    Discharge Medications   Rafy Pack   Home Medication Instructions RAMOS:[id number]    Printed on:08/05/17 1982   Medication Information                      amLODIPine (NORVASC) 10 MG tablet  Take 10 mg by mouth Daily.             aspirin 81 MG chewable tablet  Chew 81 mg Daily.             atorvastatin (LIPITOR) 80 MG tablet  Take 1 tablet by mouth Every Night.             clopidogrel (PLAVIX) 75 MG tablet  Take 1 tablet by mouth Daily.             HYDROcodone-acetaminophen (NORCO) 7.5-325 MG per tablet  Take 1 tablet by mouth 3 (Three) Times a Day.             Insulin Glargine (LANTUS SOLOSTAR) 100 UNIT/ML injection pen  Inject 10 Units under the skin Every Night.             Insulin Lispro (HUMALOG KWIKPEN) 100 UNIT/ML solution pen-injector  Inject 5 Units under the skin 3 (Three) Times a Day With Meals.             Insulin Pen Needle (PEN NEEDLES) 31G X 5 MM misc  1 each 4 (Four) Times a Day. QID with insulin             ramipril (ALTACE) 10 MG capsule  Take 10 mg by mouth 2 (Two) Times a Day.             tamsulosin (FLOMAX) 0.4 MG capsule 24 hr " capsule  Take 1 capsule by mouth Every Morning.             triamterene-hydrochlorothiazide (DYAZIDE) 37.5-25 MG per capsule  Take 1 capsule by mouth Daily.                 Discharge Diet:   Diet Instructions     Diet: Consistent Carbohydrate, Cardiac; Thin Liquids, No Restrictions       Discharge Diet:   Consistent Carbohydrate  Cardiac      Fluid Consistency:  Thin Liquids, No Restrictions                 Discharge Care Plan / Instructions: Plavix, ASA. HD statin, Insulin, close PCP follow up    Activity at Discharge:   Activity Instructions     Activity as Tolerated                     Follow-up Appointments  No future appointments.  Additional Instructions for the Follow-ups that You Need to Schedule     Discharge Follow-Up With Specified Provider    As directed    To:  Dr. Smith or his PA in 4-6 weeks       Discharge Follow-up with PCP    As directed    Follow Up Details:  PCP in 1 week, diabetes                    TAIWO William 08/05/17 11:26 AM    Time: 45 minutes    Please note that portions of this note may have been completed with a voice recognition program. Efforts were made to edit the dictations, but occasionally words are mistranscribed.

## 2017-08-07 NOTE — THERAPY DISCHARGE NOTE
Acute Care - Occupational Therapy Discharge Summary  [facility]     Patient Name: Rafy Pack  : 1950  MRN: [id number]    Today's Date: 2017  Onset of Illness/Injury or Date of Surgery Date: 17    Date of Referral to OT: 17  Referring Physician: MD ANNE      Admit Date: 8/3/2017        OT Recommendation and Plan    Visit Dx:    ICD-10-CM ICD-9-CM   1. Impaired functional mobility, balance, gait, and endurance Z74.09 V49.89   2. Impaired mobility and ADLs Z74.09 799.89                     OT Goals       17 0945          Transfer Training OT LTG    Transfer Training OT LTG, Date Established 17  -AN      Transfer Training OT LTG, Time to Achieve 1 wk  -AN      Transfer Training OT LTG, Activity Type all transfers  -AN      Transfer Training OT LTG, [place] Level set up required  -AN      Bathing OT LTG    Bathing Goal OT LTG, Date Established 17  -AN      Bathing Goal OT LTG, Time to Achieve 1 wk  -AN      Bathing Goal OT LTG, Activity Type simulates;upper body bathing;lower body bathing  -AN      Bathing Goal OT LTG, [place] Level conditional independence  -AN      Bathing Goal OT LTG, Assist Device shower chair  -AN      Safety Awareness OT LTG    Safety Awareness OT LTG, Date Established 17  -AN      Safety Awareness OT LTG, Time to Achieve 1 wk  -AN      Safety Awareness OT LTG, Activity Type good safety awareness;with ADL's;with home mgmt task  -AN      Coordination OT LTG    Coordination OT LTG, Date Established 17  -AN      Coordination OT LTG, Time to Achieve 1 wk  -AN      Coordination OT LTG, Activity Type GM task;FM task  -AN      Coordination OT LTG, [place] Level standby assist  -AN        User Key  (r) = Recorded By, (t) = Taken By, (c) = Cosigned By    Initials Name Provider Type    AN Olga Shahid OT Occupational Therapist                Outcome Measures       17 1500          Modified [place] Scale    Modified [place] Scale  3 - Moderate disability.  Requiring some help, but able to walk without assistance.  -        User Key  (r) = Recorded By, (t) = Taken By, (c) = Cosigned By    Initials Name Provider Type    JR Mehreen Marby, OT Occupational Therapist              OT Discharge Summary  Reason for Discharge: Discharge from facility  Outcomes Achieved: Refer to plan of care for updates on goals achieved  Discharge Destination: Home      Mehreen Mabry OT  8/7/2017

## 2017-08-09 NOTE — THERAPY DISCHARGE NOTE
Acute Care - Physical Therapy Discharge Summary  [facility]       Patient Name: Rafy Pack  : 1950  MRN: [id number]    Today's Date: 2017  Onset of Illness/Injury or Date of Surgery Date: 17    Date of Referral to PT: 17  Referring Physician: MD ANNE      Admit Date: 8/3/2017      PT Recommendation and Plan    Visit Dx:    ICD-10-CM ICD-9-CM   1. Impaired functional mobility, balance, gait, and endurance Z74.09 V49.89   2. Impaired mobility and ADLs Z74.09 799.89             Outcome Measures       17 1500          Modified Sanya Scale    Modified [place] Scale 3 - Moderate disability.  Requiring some help, but able to walk without assistance.  -        User Key  (r) = Recorded By, (t) = Taken By, (c) = Cosigned By    Initials Name Provider Type    JR Mehreen Mabry, OT Occupational Therapist                      IP PT Goals       17 0939          Transfer Training PT LTG    Transfer Training PT LTG, Time to Achieve 2 wks  -CD      Transfer Training PT LTG, Activity Type all transfers  -CD      Transfer Training PT LTG, [place] Level independent  -CD      Transfer Training PT LTG, Assist Device walker, rolling  -CD      Gait Training PT LTG    Gait Training Goal PT LTG, Time to Achieve 2 wks  -CD      Gait Training Goal PT LTG, [place] Level independent  -CD      Gait Training Goal PT LTG, Assist Device walker, rolling  -CD      Gait Training Goal PT LTG, Distance to Achieve 600  -CD      Patient Education PT LTG    Patient Education PT LTG, Time to Achieve 2 wks  -CD      Patient Education PT LTG, Education Type written program;benefits of activity;home safety  -CD      Patient Education PT LTG, Education Understanding demonstrate adequately;verbalize understanding  -CD        User Key  (r) = Recorded By, (t) = Taken By, (c) = Cosigned By    Initials Name Provider Type    REYNALDO Eli, PT Physical Therapist            Goals Status: Treatment plan  discontinued secondary to discharge from acute facility.    PT Discharge Summary  Reason for Discharge: Discharge from facility  Outcomes Achieved: Refer to plan of care for updates on goals achieved  Discharge Destination: Home with assist      Bharti Holguin, PT   8/9/2017

## 2017-09-01 ENCOUNTER — OFFICE VISIT (OUTPATIENT)
Dept: NEUROLOGY | Facility: CLINIC | Age: 67
End: 2017-09-01

## 2017-09-01 VITALS — BODY MASS INDEX: 42.65 KG/M2 | WEIGHT: 288 LBS | HEIGHT: 69 IN

## 2017-09-01 DIAGNOSIS — I63.9 CEREBELLAR STROKE (HCC): Primary | ICD-10-CM

## 2017-09-01 PROCEDURE — 99213 OFFICE O/P EST LOW 20 MIN: CPT | Performed by: PHYSICIAN ASSISTANT

## 2017-09-01 NOTE — PROGRESS NOTES
"Subjective     History of Present Illness   aRfy Pack is a 67 y.o. male who comes to clinic today following a hospitalization at formerly [facility] in 8/17 for stroke. He developed sudden onset vertigo and ataxia on 7/26/17. This improved significantly over time, though he continued to note mild intermittent vertigo just prior to his hospitalization. He denied any additional associated neurological symptoms including gait ataxia or limb ataxia. An MRI was notable for a subacute left cerebellar infarct. An echo and carotids were unremarkable as well as an MRA of the head and neck. He was subsequently treated with ASA 81mg and Plavix as well Lipitor.     Since his hospitalization, he continues to note mild intermittent vertigo, though this has gradually improved. He denies any new concerns.       The following portions of the patient's history were reviewed and updated as appropriate: allergies, current medications, past family history, past medical history, past social history, past surgical history and problem list.    Review of Systems   Constitutional: Negative.    HENT: Negative.    Eyes: Negative.    Respiratory: Negative.    Cardiovascular: Negative.    Gastrointestinal: Negative.    Endocrine: Negative.    Genitourinary: Negative.    Musculoskeletal: Negative.    Skin: Negative.    Allergic/Immunologic: Negative.    Hematological: Negative.    Psychiatric/Behavioral: Negative.        Objective     Ht 69\" (175.3 cm)  Wt 288 lb (131 kg)  BMI 42.53 kg/m2    General appearance today is normal.       Physical Exam   Neurological: He has normal strength. He has a normal Finger-Nose-Finger Test. Gait normal.   Reflex Scores:       Tricep reflexes are 1+ on the right side and 1+ on the left side.       Bicep reflexes are 1+ on the right side and 1+ on the left side.       Brachioradialis reflexes are 1+ on the right side and 1+ on the left side.       Patellar reflexes are 1+ on the right side and 1+ on the left " side.  Psychiatric: His speech is normal.        Neurologic Exam     Mental Status   Speech: speech is normal   Level of consciousness: alert  Normal comprehension.     Cranial Nerves   Cranial nerves II through XII intact.     Motor Exam   Muscle bulk: normal  Overall muscle tone: normal    Strength   Strength 5/5 throughout.     Sensory Exam   Light touch normal.     Gait, Coordination, and Reflexes     Gait  Gait: normal    Coordination   Finger to nose coordination: normal    Tremor   Resting tremor: absent    Reflexes   Right brachioradialis: 1+  Left brachioradialis: 1+  Right biceps: 1+  Left biceps: 1+  Right triceps: 1+  Left triceps: 1+  Right patellar: 1+  Left patellar: 1+          Assessment/Plan   Rafy was seen today for stroke.    Diagnoses and all orders for this visit:    Cerebellar stroke          Discussion/Summary   Rafy Pack returns to clinic today with a history of stroke. His workup has been complete and appropriate. Therefore, I do not have any additional recommendations concerning this. After discussing potential treatment options, it was elected to continue on ASA, Plavix, and Lipitor unchanged. After the initial 90 days following his stroke, I would considering discontinuing ASA and simply continuing on Plavix thereafter. He will then follow up in our clinic on an as needed basis.      I spent 15 minutes out of 20 minutes face to face with the patient and family and discussing diagnosis, prognosis, diagnostic testing, evaluation, current status, treatment options and management.    As part of this visit I reviewed radiology results, obtained additional history from the family which is incorporated in the HPI and reviewed records from prior hospitalizations which is incorporated in the HPI.    Latisha Wilson PA-C